# Patient Record
Sex: MALE | Race: WHITE | NOT HISPANIC OR LATINO | URBAN - METROPOLITAN AREA
[De-identification: names, ages, dates, MRNs, and addresses within clinical notes are randomized per-mention and may not be internally consistent; named-entity substitution may affect disease eponyms.]

---

## 2017-01-31 NOTE — PROGRESS NOTES
"DATE OF VISIT : 2/1/2017  LAST VISIT:02/03/2016      HISTORY OF PRESENT ILLNESS:  Mr. Jensen is a 61-year-old man originally diagnosed with CLL/SLL in 1995 ,Hernandez stage 0. He has been monitored since diagnosis without the need for therapeutic intervention. His hemoglobin and platelet count have always been stable. His lymphocyte count olga from diagnosis to 1999, when the peak was 135,500, and subsequently has slowly and steadily declined. More recently, it has been stable. He was last seen in our clinic a year ago.      INTERVAL HISTORY : Mr. Jensen has had a relatively uneventful year. He has accident while handling boat in October and had injury to left leg which is now improved.    Denies chest pain, SOB, cough, phlegmn, nausea, vomiting, abdominal pain, diarrhoea, constipation, fatigue, loss of weight, loss of appetite, urinary problems, headache, dizziness, blurry vision, fevers, chills, night sweats.     REVIEW OF SYSTEMS:  A 10-point review of systems is otherwise negative.      MEDICATIONS:   1.  Simvastatin.   2.  Citalopram.   3.  Aspirin 81 mg.      ALLERGIES:  None.      PHYSICAL EXAMINATION:   VITAL SIGNS: /79 mmHg  Pulse 84  Temp(Src) 97.9  F (36.6  C) (Oral)  Resp 12  Ht 1.712 m (5' 7.4\")  Wt 86.909 kg (191 lb 9.6 oz)  BMI 29.65 kg/m2  SpO2 95%  HEENT: Atraumatic.Anicteric. No conjunctival injection. Oropharynx - no masses.  Mucosae - moist and without lesion.   NECK:  Supple. No cervical or supraclavicular adenopathy.   CHEST:  Clear to auscultation.   AXILLAE:  No nodes.   HEART:  Regular rhythm.  No murmur.   ABDOMEN:  Soft.  No tenderness.  Bowel sounds present.  No organomegaly or mass.  No  Inguinal/femoral nodes.    EXTREMITIES:  No lower extremity edema.   SKIN:  No rashes.      LABORATORY:     Hemoglobin 15.7 grams percent with 26,900 WBC`s and 166,000 platelets.     Electrolytes - normal.  Creatinine 1.02.       Liver enzymes - normal.         ASSESSMENT AND PLAN:  Chronic " lymphocytic leukemia/small lymphocytic lymphoma , Hernandez stage 0 : Mr. Jensen is 20 years from diagnosis . He has not had significant pancytopenia, lymphadenopathy or splenomegaly, nor any apparent increase in infections, etc since diagnosis . Interestingly , he had a substantial rise in his total lymphocyte count during the late 1990s and then a subsequent fall that has been slow but relatively steady since 2002 . For the last several years his absolute lymphocyte count has been relatively stable between 20,000-30,000. He has maintained a normal hemoglobin, absolute neutrophil count and borderline platelets (~140,000-150,000). Immunoglobulin (total) levels have been stable at 0.7 (lower end of normal range) .     Mr. Jensen is doing well and will return to clinic in approximately 1 year.  In the interim , if he develops any concerning symptoms we would be happy to see him at any time .          Patient seen and discussed with Dr Ferguson.    LOLA Myles, MS.  Hematology/Oncology Fellow  [facility]  Pager [phone]    Oncology Attending    Patient seen and examined with the Oncology Fellow, Dr. Hein. Agree with her findings, assessment and plan.    Armando Ferguson MD  Professor of Medicine  Oncology  [facility]  Office: [phone]  Clinic Fax: [phone]        cc:  Elver Bourgeois MD

## 2017-02-01 ENCOUNTER — ONCOLOGY VISIT (OUTPATIENT)
Dept: ONCOLOGY | Facility: CLINIC | Age: 62
End: 2017-02-01
Attending: INTERNAL MEDICINE
Payer: COMMERCIAL

## 2017-02-01 VITALS
RESPIRATION RATE: 12 BRPM | HEIGHT: 67 IN | SYSTOLIC BLOOD PRESSURE: 116 MMHG | DIASTOLIC BLOOD PRESSURE: 79 MMHG | TEMPERATURE: 97.9 F | HEART RATE: 84 BPM | OXYGEN SATURATION: 95 % | WEIGHT: 191.6 LBS | BODY MASS INDEX: 30.07 KG/M2

## 2017-02-01 DIAGNOSIS — C91.10 CHRONIC LYMPHOCYTIC LEUKEMIA (H): Primary | ICD-10-CM

## 2017-02-01 DIAGNOSIS — C91.10 CHRONIC LYMPHOCYTIC LEUKEMIA (H): ICD-10-CM

## 2017-02-01 LAB
ALBUMIN SERPL-MCNC: 4.1 G/DL (ref 3.4–5)
ALP SERPL-CCNC: 84 U/L (ref 40–150)
ALT SERPL W P-5'-P-CCNC: 28 U/L (ref 0–70)
ANION GAP SERPL CALCULATED.3IONS-SCNC: 9 MMOL/L (ref 3–14)
AST SERPL W P-5'-P-CCNC: 16 U/L (ref 0–45)
BASOPHILS # BLD AUTO: 0.3 10E9/L (ref 0–0.2)
BASOPHILS NFR BLD AUTO: 1 %
BILIRUB SERPL-MCNC: 0.4 MG/DL (ref 0.2–1.3)
BUN SERPL-MCNC: 18 MG/DL (ref 7–30)
CALCIUM SERPL-MCNC: 9 MG/DL (ref 8.5–10.1)
CHLORIDE SERPL-SCNC: 104 MMOL/L (ref 94–109)
CO2 SERPL-SCNC: 28 MMOL/L (ref 20–32)
CREAT SERPL-MCNC: 1.02 MG/DL (ref 0.66–1.25)
DIFFERENTIAL METHOD BLD: ABNORMAL
EOSINOPHIL # BLD AUTO: 0.3 10E9/L (ref 0–0.7)
EOSINOPHIL NFR BLD AUTO: 1 %
ERYTHROCYTE [DISTWIDTH] IN BLOOD BY AUTOMATED COUNT: 12.9 % (ref 10–15)
GFR SERPL CREATININE-BSD FRML MDRD: 74 ML/MIN/1.7M2
GLUCOSE SERPL-MCNC: 86 MG/DL (ref 70–99)
HCT VFR BLD AUTO: 46.2 % (ref 40–53)
HGB BLD-MCNC: 15.7 G/DL (ref 13.3–17.7)
LDH SERPL L TO P-CCNC: 170 U/L (ref 85–227)
LYMPHOCYTES # BLD AUTO: 20.4 10E9/L (ref 0.8–5.3)
LYMPHOCYTES NFR BLD AUTO: 76 %
MCH RBC QN AUTO: 29.4 PG (ref 26.5–33)
MCHC RBC AUTO-ENTMCNC: 34 G/DL (ref 31.5–36.5)
MCV RBC AUTO: 87 FL (ref 78–100)
MONOCYTES # BLD AUTO: 0.8 10E9/L (ref 0–1.3)
MONOCYTES NFR BLD AUTO: 3 %
NEUTROPHILS # BLD AUTO: 5.1 10E9/L (ref 1.6–8.3)
NEUTROPHILS NFR BLD AUTO: 19 %
PLATELET # BLD AUTO: 166 10E9/L (ref 150–450)
POTASSIUM SERPL-SCNC: 4 MMOL/L (ref 3.4–5.3)
PROT SERPL-MCNC: 7 G/DL (ref 6.8–8.8)
RBC # BLD AUTO: 5.34 10E12/L (ref 4.4–5.9)
RBC MORPH BLD: NORMAL
SODIUM SERPL-SCNC: 142 MMOL/L (ref 133–144)
WBC # BLD AUTO: 26.9 10E9/L (ref 4–11)

## 2017-02-01 PROCEDURE — 36415 COLL VENOUS BLD VENIPUNCTURE: CPT | Performed by: HOSPITALIST

## 2017-02-01 PROCEDURE — 99213 OFFICE O/P EST LOW 20 MIN: CPT | Mod: ZP | Performed by: INTERNAL MEDICINE

## 2017-02-01 PROCEDURE — 82232 ASSAY OF BETA-2 PROTEIN: CPT | Performed by: HOSPITALIST

## 2017-02-01 PROCEDURE — 99212 OFFICE O/P EST SF 10 MIN: CPT

## 2017-02-01 PROCEDURE — 80053 COMPREHEN METABOLIC PANEL: CPT | Performed by: HOSPITALIST

## 2017-02-01 PROCEDURE — 85025 COMPLETE CBC W/AUTO DIFF WBC: CPT | Performed by: HOSPITALIST

## 2017-02-01 PROCEDURE — 83615 LACTATE (LD) (LDH) ENZYME: CPT | Performed by: HOSPITALIST

## 2017-02-01 ASSESSMENT — PAIN SCALES - GENERAL: PAINLEVEL: NO PAIN (0)

## 2017-02-01 NOTE — Clinical Note
"2/1/2017       RE: Lito Jensen  [address]  [place] [address]     Dear Colleague,    Thank you for referring your patient, Lito Jensen, to the [facility]. Please see a copy of my visit note below.    DATE OF VISIT : 2/1/2017  LAST VISIT:02/03/2016      HISTORY OF PRESENT ILLNESS:  Mr. Jensen is a 61-year-old man originally diagnosed with CLL/SLL in 1995 ,Hernandez stage 0.He has been monitored since diagnosis without the need for therapeutic intervention. His hemoglobin and platelet count have always been stable. His lymphocyte count olga from diagnosis to 1999, when the peak was 135,500, and subsequently has slowly and steadily declined.  He was last seen in our clinic a year ago.      INTERVAL HISTORY : Mr. Jensen has had a relatively uneventful year. He has accident while handling boat in October and had injury to left leg which is now improved.  Denies chest pain, SOB, cough, phlegmn, nausea, vomiting, abdominal pain, diarrhoea, constipation, fatigue, loss of weight, loss of appetite, urinary problems, headache, dizziness, blurry vision, fevers, chills, night sweats.     REVIEW OF SYSTEMS:  A 10-point review of systems is otherwise negative.      MEDICATIONS:   1.  Simvastatin.   2.  Citalopram.   3.  Aspirin 81 mg.      ALLERGIES:  None.      PHYSICAL EXAMINATION:   VITAL SIGNS: /79 mmHg  Pulse 84  Temp(Src) 97.9  F (36.6  C) (Oral)  Resp 12  Ht 1.712 m (5' 7.4\")  Wt 86.909 kg (191 lb 9.6 oz)  BMI 29.65 kg/m2  SpO2 95%  HEENT: Atraumatic, normocephalic head . Anicteric. No conjunctival injection. Oropharynx - no masses.  Mucosae - moist and without lesion.   NECK:  Supple. Thyroid normal.  No cervical or supraclavicular adenopathy.   CHEST:  Clear to auscultation.   AXILLAE:  No nodes.   HEART:  Regular rhythm.  No murmur.   ABDOMEN:  Soft.  No tenderness.  Bowel sounds present.  No organomegaly or mass.  No  Inguinal/femoral nodes.    EXTREMITIES: "  No lower extremity edema.   SKIN:  No rashes.      LABORATORY:     Hemoglobin 15.7 grams percent with 26,900 WBC`s and 166,000 platelets.     Electrolytes - normal.  Creatinine 1.02.       Liver enzymes - normal.            ASSESSMENT AND PLAN:  Chronic lymphocytic leukemia/small lymphocytic lymphoma , Hernandez stage 0 : Mr. Jensen is 20 years from diagnosis . He has not had significant pancytopenia, lymphadenopathy or splenomegaly, nor any apparent increase in infections, etc since diagnosis . Interestingly , he had a substantial rise in his total lymphocyte count during the late 1990s and then a subsequent fall that has been slow but relatively steady since 2002 . For the last several years his absolute lymphocyte count has been relatively stable between 20,000-30,000. He has maintained a normal hemoglobin, absolute neutrophil count and borderline platelets (~140,000-150,000). Immunoglobulin (total) levels have been stable at 0.7 (lower end of normal range) .     Mr. Jensen is doing well and will return to clinic in approximately 1 year.  In the interim , if he develops any concerning symptoms we would be happy to see him at any time .          Patient seen and discussed with Dr Ferguson.    LOLA Myles, MS.  Hematology/Oncology Fellow  [facility]  Pager [phone]      cc:  Elver Bourgeois MD         Again, thank you for allowing me to participate in the care of your patient.      Sincerely,    Armando Ferguson MD

## 2017-02-01 NOTE — NURSING NOTE
"Lito Jensen is a 61 year old male who presents for:  Chief Complaint   Patient presents with     Oncology Clinic Visit     ump return        Initial Vitals:  /79 mmHg  Pulse 84  Temp(Src) 97.9  F (36.6  C) (Oral)  Resp 12  Ht 1.712 m (5' 7.4\")  Wt 86.909 kg (191 lb 9.6 oz)  BMI 29.65 kg/m2  SpO2 95% Estimated body mass index is 29.65 kg/(m^2) as calculated from the following:    Height as of this encounter: 1.712 m (5' 7.4\").    Weight as of this encounter: 86.909 kg (191 lb 9.6 oz).. Body surface area is 2.03 meters squared. BP completed using cuff size: regular  No Pain (0) No LMP for male patient. Allergies and medications reviewed.     Medications: Medication refills not needed today.  Pharmacy name entered into [facility]: [facility] - [place], [place] - [address] [place] [address]    Comments: pt denies pain    5 minutes for nursing intake (face to face time)   Virgilio Dawson CMA          "

## 2017-02-01 NOTE — Clinical Note
"2/1/2017      RE: Lito Jensen  [address] [place] [address]       DATE OF VISIT : 2/1/2017  LAST VISIT:02/03/2016      HISTORY OF PRESENT ILLNESS:  Mr. Jensen is a 61-year-old man originally diagnosed with CLL/SLL in 1995 ,Hernandez stage 0. He has been monitored since diagnosis without the need for therapeutic intervention. His hemoglobin and platelet count have always been stable. His lymphocyte count olga from diagnosis to 1999, when the peak was 135,500, and subsequently has slowly and steadily declined. More recently, it has been stable. He was last seen in our clinic a year ago.      INTERVAL HISTORY : Mr. Jensen has had a relatively uneventful year. He has accident while handling boat in October and had injury to left leg which is now improved.    Denies chest pain, SOB, cough, phlegmn, nausea, vomiting, abdominal pain, diarrhoea, constipation, fatigue, loss of weight, loss of appetite, urinary problems, headache, dizziness, blurry vision, fevers, chills, night sweats.     REVIEW OF SYSTEMS:  A 10-point review of systems is otherwise negative.      MEDICATIONS:   1.  Simvastatin.   2.  Citalopram.   3.  Aspirin 81 mg.      ALLERGIES:  None.      PHYSICAL EXAMINATION:   VITAL SIGNS: /79 mmHg  Pulse 84  Temp(Src) 97.9  F (36.6  C) (Oral)  Resp 12  Ht 1.712 m (5' 7.4\")  Wt 86.909 kg (191 lb 9.6 oz)  BMI 29.65 kg/m2  SpO2 95%  HEENT: Atraumatic.Anicteric. No conjunctival injection. Oropharynx - no masses.  Mucosae - moist and without lesion.   NECK:  Supple. No cervical or supraclavicular adenopathy.   CHEST:  Clear to auscultation.   AXILLAE:  No nodes.   HEART:  Regular rhythm.  No murmur.   ABDOMEN:  Soft.  No tenderness.  Bowel sounds present.  No organomegaly or mass.  No  Inguinal/femoral nodes.    EXTREMITIES:  No lower extremity edema.   SKIN:  No rashes.      LABORATORY:     Hemoglobin 15.7 grams percent with 26,900 WBC`s and 166,000 platelets.     Electrolytes - " normal.  Creatinine 1.02.       Liver enzymes - normal.         ASSESSMENT AND PLAN:  Chronic lymphocytic leukemia/small lymphocytic lymphoma , Hernandez stage 0 : Mr. Jensen is 20 years from diagnosis . He has not had significant pancytopenia, lymphadenopathy or splenomegaly, nor any apparent increase in infections, etc since diagnosis . Interestingly , he had a substantial rise in his total lymphocyte count during the late 1990s and then a subsequent fall that has been slow but relatively steady since 2002 . For the last several years his absolute lymphocyte count has been relatively stable between 20,000-30,000. He has maintained a normal hemoglobin, absolute neutrophil count and borderline platelets (~140,000-150,000). Immunoglobulin (total) levels have been stable at 0.7 (lower end of normal range) .     Mr. Jensen is doing well and will return to clinic in approximately 1 year.  In the interim , if he develops any concerning symptoms we would be happy to see him at any time .          Patient seen and discussed with Dr Ferguson.    LOLA Myles, MS.  Hematology/Oncology Fellow  [facility]  Pager [phone]    Oncology Attending    Patient seen and examined with the Oncology Fellow, Dr. Hein. Agree with her findings, assessment and plan.    Armando Ferguson MD  Professor of Medicine  Oncology  [facility]  Office: [phone]  Clinic Fax: [phone]        cc:  MD Armando Fields MD

## 2017-02-02 LAB — B2 MICROGLOB SERPL-MCNC: 2.1 MG/L

## 2018-01-31 ENCOUNTER — TRANSFERRED RECORDS (OUTPATIENT)
Dept: HEALTH INFORMATION MANAGEMENT | Facility: CLINIC | Age: 63
End: 2018-01-31

## 2018-01-31 ENCOUNTER — APPOINTMENT (OUTPATIENT)
Dept: LAB | Facility: CLINIC | Age: 63
End: 2018-01-31
Attending: INTERNAL MEDICINE
Payer: COMMERCIAL

## 2018-01-31 ENCOUNTER — ONCOLOGY VISIT (OUTPATIENT)
Dept: ONCOLOGY | Facility: CLINIC | Age: 63
End: 2018-01-31
Attending: INTERNAL MEDICINE
Payer: COMMERCIAL

## 2018-01-31 VITALS
RESPIRATION RATE: 16 BRPM | OXYGEN SATURATION: 94 % | HEIGHT: 67 IN | WEIGHT: 203.71 LBS | SYSTOLIC BLOOD PRESSURE: 109 MMHG | HEART RATE: 90 BPM | BODY MASS INDEX: 31.97 KG/M2 | TEMPERATURE: 97.4 F | DIASTOLIC BLOOD PRESSURE: 74 MMHG

## 2018-01-31 DIAGNOSIS — C91.10 CHRONIC LYMPHOCYTIC LEUKEMIA (H): ICD-10-CM

## 2018-01-31 LAB
ALBUMIN SERPL-MCNC: 4.1 G/DL (ref 3.4–5)
ALP SERPL-CCNC: 91 U/L (ref 40–150)
ALT SERPL W P-5'-P-CCNC: 33 U/L (ref 0–70)
ANION GAP SERPL CALCULATED.3IONS-SCNC: 7 MMOL/L (ref 3–14)
AST SERPL W P-5'-P-CCNC: 22 U/L (ref 0–45)
BASOPHILS # BLD AUTO: 0 10E9/L (ref 0–0.2)
BASOPHILS NFR BLD AUTO: 0 %
BILIRUB SERPL-MCNC: 0.4 MG/DL (ref 0.2–1.3)
BUN SERPL-MCNC: 22 MG/DL (ref 7–30)
CALCIUM SERPL-MCNC: 8.7 MG/DL (ref 8.5–10.1)
CHLORIDE SERPL-SCNC: 105 MMOL/L (ref 94–109)
CO2 SERPL-SCNC: 26 MMOL/L (ref 20–32)
CREAT SERPL-MCNC: 0.92 MG/DL (ref 0.66–1.25)
DIFFERENTIAL METHOD BLD: ABNORMAL
EOSINOPHIL # BLD AUTO: 0.7 10E9/L (ref 0–0.7)
EOSINOPHIL NFR BLD AUTO: 3 %
ERYTHROCYTE [DISTWIDTH] IN BLOOD BY AUTOMATED COUNT: 12.8 % (ref 10–15)
GFR SERPL CREATININE-BSD FRML MDRD: 83 ML/MIN/1.7M2
GLUCOSE SERPL-MCNC: 89 MG/DL (ref 70–99)
HCT VFR BLD AUTO: 46.4 % (ref 40–53)
HGB BLD-MCNC: 15.7 G/DL (ref 13.3–17.7)
LYMPHOCYTES # BLD AUTO: 12.8 10E9/L (ref 0.8–5.3)
LYMPHOCYTES NFR BLD AUTO: 57 %
MCH RBC QN AUTO: 29.5 PG (ref 26.5–33)
MCHC RBC AUTO-ENTMCNC: 33.8 G/DL (ref 31.5–36.5)
MCV RBC AUTO: 87 FL (ref 78–100)
MONOCYTES # BLD AUTO: 0.7 10E9/L (ref 0–1.3)
MONOCYTES NFR BLD AUTO: 3 %
NEUTROPHILS # BLD AUTO: 8.3 10E9/L (ref 1.6–8.3)
NEUTROPHILS NFR BLD AUTO: 37 %
PLATELET # BLD AUTO: 163 10E9/L (ref 150–450)
POTASSIUM SERPL-SCNC: 4.2 MMOL/L (ref 3.4–5.3)
PROT SERPL-MCNC: 7.2 G/DL (ref 6.8–8.8)
RBC # BLD AUTO: 5.33 10E12/L (ref 4.4–5.9)
RBC MORPH BLD: NORMAL
SODIUM SERPL-SCNC: 139 MMOL/L (ref 133–144)
WBC # BLD AUTO: 22.4 10E9/L (ref 4–11)

## 2018-01-31 PROCEDURE — 85025 COMPLETE CBC W/AUTO DIFF WBC: CPT | Performed by: INTERNAL MEDICINE

## 2018-01-31 PROCEDURE — 36415 COLL VENOUS BLD VENIPUNCTURE: CPT

## 2018-01-31 PROCEDURE — G0463 HOSPITAL OUTPT CLINIC VISIT: HCPCS | Mod: ZF

## 2018-01-31 PROCEDURE — 00000402 ZZHCL STATISTIC TOTAL PROTEIN: Performed by: INTERNAL MEDICINE

## 2018-01-31 PROCEDURE — 99213 OFFICE O/P EST LOW 20 MIN: CPT | Mod: ZP | Performed by: INTERNAL MEDICINE

## 2018-01-31 PROCEDURE — 84165 PROTEIN E-PHORESIS SERUM: CPT | Performed by: INTERNAL MEDICINE

## 2018-01-31 PROCEDURE — 80053 COMPREHEN METABOLIC PANEL: CPT | Performed by: INTERNAL MEDICINE

## 2018-01-31 ASSESSMENT — PAIN SCALES - GENERAL: PAINLEVEL: NO PAIN (0)

## 2018-01-31 NOTE — MR AVS SNAPSHOT
"              After Visit Summary   2018    Lito Jensen    MRN: [id number]           Patient Information     Date Of Birth          1955        Visit Information        Provider Department      2018 4:00 PM Armando Ferguson MD [facility]        Today's Diagnoses     Chronic lymphocytic leukemia (H)           Follow-ups after your visit        Follow-up notes from your care team     Return in about 1 year (around 2019) for Physical Exam, Lab Work.      Who to contact     If you have questions or need follow up information about today's clinic visit or your schedule please contact [facility] directly at [phone].  Normal or non-critical lab and imaging results will be communicated to you by MyChart, letter or phone within 4 business days after the clinic has received the results. If you do not hear from us within 7 days, please contact the clinic through MyChart or phone. If you have a critical or abnormal lab result, we will notify you by phone as soon as possible.  Submit refill requests through [employer] or call your pharmacy and they will forward the refill request to us. Please allow 3 business days for your refill to be completed.          Additional Information About Your Visit        MyChart Information     [employer] lets you send messages to your doctor, view your test results, renew your prescriptions, schedule appointments and more. To sign up, go to [email or web address] . Click on \"Log in\" on the left side of the screen, which will take you to the Welcome page. Then click on \"Sign up Now\" on the right side of the page.     You will be asked to enter the access code listed below, as well as some personal information. Please follow the directions to create your username and password.     Your access code is: [email or web address]  Expires: 2018  6:30 AM     Your access code will  in 90 days. If you need help or a new code, please call " "your [facility] or [phone].        Care EveryWhere ID     This is your Care EveryWhere ID. This could be used by other organizations to access your [facility] medical records  [id number]        Your Vitals Were     Pulse Temperature Respirations Height Pulse Oximetry BMI (Body Mass Index)    90 97.4  F (36.3  C) (Oral) 16 1.712 m (5' 7.4\") 94% 31.53 kg/m2       Blood Pressure from Last 3 Encounters:   01/31/18 109/74   02/01/17 116/79   02/03/16 123/70    Weight from Last 3 Encounters:   01/31/18 92.4 kg (203 lb 11.3 oz)   02/01/17 86.9 kg (191 lb 9.6 oz)   02/03/16 87.5 kg (192 lb 14.4 oz)              We Performed the Following     *CBC with platelets differential     Comprehensive metabolic panel     Protein electrophoresis        Primary Care Provider Office Phone # Fax #    Armando Ferguson [phone]       [address] [place] [address]        Equal Access to Services     [facility]: Hadii aad lillie hadasho Sojohn, waaxda luqadaha, qaybta kaalmada lyric, baylee moore . So [facility] [phone].    ATENCIÓN: Si habla español, tiene a whipple disposición servicios gratuitos de asistencia lingüística. [facility] [phone].    We comply with applicable federal civil rights laws and [place] laws. We do not discriminate on the basis of race, color, national origin, age, disability, sex, sexual orientation, or gender identity.            Thank you!     Thank you for choosing [facility]  for your care. Our goal is always to provide you with excellent care. Hearing back from our patients is one way we can continue to improve our services. Please take a few minutes to complete the written survey that you may receive in the mail after your visit with us. Thank you!             Your Updated Medication List - Protect others around you: Learn how to safely use, store and throw away your medicines at www.disposemymeds.org.        "   This list is accurate as of 1/31/18 11:59 PM.  Always use your most recent med list.                   Brand Name Dispense Instructions for use Diagnosis    aspirin 81 MG tablet      Take 1 tablet by mouth daily.    Chronic lymphoid leukemia, without mention of having achieved remission(204.10) (H)       citalopram 10 MG tablet    celeXA     Take 10 mg by mouth daily.    Chronic lymphoid leukemia, without mention of having achieved remission(204.10) (H)       simvastatin 10 MG tablet    ZOCOR     Take 10 mg by mouth At Bedtime.    Chronic lymphoid leukemia, without mention of having achieved remission(204.10) (H)       UNABLE TO FIND      2 times daily. Flekainid 200 mg twice daily

## 2018-01-31 NOTE — LETTER
1/31/2018      RE: Lito Jensen  [address] [place] [address]       ONCOLOGY SUMMARY:  Mr. Jensen is a 62-year-old originally diagnosed with CLL/SLL in 1995 ,Hernandez stage 0. He has been monitored since diagnosis without the need for therapeutic intervention. His hemoglobin and platelet count have always been stable. His lymphocyte count olga from diagnosis to 1999, when the peak reached 135,500, and subsequently has slowly and steadily declined. More recently, it has been stable. He was last seen in our clinic a year ago.        INTERVAL HISTORY:  Mr. Jensen was last in clinic on 02/01/2017.  Health-wise, this has been an uneventful year.  He has had an upper respiratory tract infection beginning in December.  This went on and there was an exacerbation or a second infection subsequently.  He was given a course of antibiotics.  Slowly, all the symptoms have resolved.  He had no fever with the infection.       Otherwise, his health has been fine.  He is stressed by the illness of his mother-in-law.  Mr. Jensen and his wife have assumed considerable responsibility for her care.       REVIEW OF SYSTEMS:  A 10-point review of systems is essentially negative.      MEDICATIONS:   1.  Simvastatin.   2.  Citalopram.   3.  Aspirin 81 mg.      ALLERGIES:  None reported.      PHYSICAL EXAMINATION:   VITAL SIGNS:  Weight 92.4 kg (compared with 86.9 kg in 02/2017), blood pressure 109/74, pulse 90 and regular, respirations 16, temperature 97.4, O2 saturation 94% on room air.   HEENT:  Anicteric.  Oropharynx - no masses.  Mucosa - moist.  No plaque or ulceration.   NECK:  No cervical/supraclavicular adenopathy.  Thyroid appears normal.   CHEST:  Clear to auscultation.   AXILLAE:  No nodes.   HEART:  Regular rhythm without murmur or gallop.   ABDOMEN:  Soft and nontender.  Bowel sounds active.  The liver is at the right costal margin and spans approximately 10 cm.  The spleen is not palpable or  percussible.  No masses.  No significant inguinal/femoral nodes.   EXTREMITIES:  No lower extremity edema.  No epitrochlear nodes.   SKIN:  No evident rashes.      LABORATORY:     Hemoglobin 15.7 grams percent with 22,400 WBCs (37% neutrophils, 57% lymphocytes) and 163,000 platelets.   Electrolytes, calcium, creatinine (0.92) - normal.    Liver enzymes - normal.   Glucose 89 (non-fasting).   SPEP: albumin 4.5, Ig 0.8, no monoclonal peak     ASSESSMENT AND PLAN:  Chronic lymphocytic leukemia/small lymphocytic lymphoma, Hernandez stage 0. Mr. Jensen continues to do well over 2 decades after the time of diagnosis.  His absolute lymphocyte count continues to fall slowly with his WBC as summarized in my note from a year ago.  Hemoglobin, neutrophils and platelets remain normal.      Return to clinic in 1 year with laboratory studies.     Armando Ferguson MD  Professor of Medicine  Oncology  [facility]  Office: [phone]  Clinic Fax: [phone]       cc:  MD Armando Fields MD

## 2018-01-31 NOTE — NURSING NOTE
Chief Complaint   Patient presents with     Blood Draw     labs drawn via venipuncture     /74 (BP Location: Right arm, Patient Position: Chair, Cuff Size: Adult Large)  Pulse 90  Temp 97.4  F (36.3  C) (Oral)  Wt 92.4 kg (203 lb 11.3 oz)  SpO2 94%  BMI 31.53 kg/m2    Vitals taken.  Labs collected and sent from right  antecubital venipuncture. Pt tolerated well. Pt checked in for next appointment.    Ileana Gregorio RN

## 2018-01-31 NOTE — PROGRESS NOTES
ONCOLOGY SUMMARY:  Mr. Jensen is a 62-year-old originally diagnosed with CLL/SLL in 1995 ,Hernandez stage 0. He has been monitored since diagnosis without the need for therapeutic intervention. His hemoglobin and platelet count have always been stable. His lymphocyte count olga from diagnosis to 1999, when the peak reached 135,500, and subsequently has slowly and steadily declined. More recently, it has been stable. He was last seen in our clinic a year ago.        INTERVAL HISTORY:  Mr. Jensen was last in clinic on 02/01/2017.  Health-wise, this has been an uneventful year.  He has had an upper respiratory tract infection beginning in December.  This went on and there was an exacerbation or a second infection subsequently.  He was given a course of antibiotics.  Slowly, all the symptoms have resolved.  He had no fever with the infection.       Otherwise, his health has been fine.  He is stressed by the illness of his mother-in-law.  Mr. Jensen and his wife have assumed considerable responsibility for her care.       REVIEW OF SYSTEMS:  A 10-point review of systems is essentially negative.      MEDICATIONS:   1.  Simvastatin.   2.  Citalopram.   3.  Aspirin 81 mg.      ALLERGIES:  None reported.      PHYSICAL EXAMINATION:   VITAL SIGNS:  Weight 92.4 kg (compared with 86.9 kg in 02/2017), blood pressure 109/74, pulse 90 and regular, respirations 16, temperature 97.4, O2 saturation 94% on room air.   HEENT:  Anicteric.  Oropharynx - no masses.  Mucosa - moist.  No plaque or ulceration.   NECK:  No cervical/supraclavicular adenopathy.  Thyroid appears normal.   CHEST:  Clear to auscultation.   AXILLAE:  No nodes.   HEART:  Regular rhythm without murmur or gallop.   ABDOMEN:  Soft and nontender.  Bowel sounds active.  The liver is at the right costal margin and spans approximately 10 cm.  The spleen is not palpable or percussible.  No masses.  No significant inguinal/femoral nodes.   EXTREMITIES:  No lower extremity  edema.  No epitrochlear nodes.   SKIN:  No evident rashes.      LABORATORY:     Hemoglobin 15.7 grams percent with 22,400 WBCs (37% neutrophils, 57% lymphocytes) and 163,000 platelets.   Electrolytes, calcium, creatinine (0.92) - normal.    Liver enzymes - normal.   Glucose 89 (non-fasting).   SPEP: albumin 4.5, Ig 0.8, no monoclonal peak     ASSESSMENT AND PLAN:  Chronic lymphocytic leukemia/small lymphocytic lymphoma, Hernandez stage 0. Mr. Jensen continues to do well over 2 decades after the time of diagnosis.  His absolute lymphocyte count continues to fall slowly with his WBC as summarized in my note from a year ago.  Hemoglobin, neutrophils and platelets remain normal.      Return to clinic in 1 year with laboratory studies.     Armando Ferguson MD  Professor of Medicine  Oncology  [facility]  Office: [phone]  Clinic Fax: [phone]       cc:  Elver Bourgeois MD

## 2018-01-31 NOTE — NURSING NOTE
"Oncology Rooming Note    January 31, 2018 3:43 PM   Lito Jensen is a 62 year old male who presents for:    Chief Complaint   Patient presents with     Blood Draw     labs drawn via venipuncture     Oncology Clinic Visit     Return visit related to CLL     Initial Vitals: /74 (BP Location: Right arm, Patient Position: Chair, Cuff Size: Adult Large)  Pulse 90  Temp 97.4  F (36.3  C) (Oral)  Resp 16  Ht 1.712 m (5' 7.4\")  Wt 92.4 kg (203 lb 11.3 oz)  SpO2 94%  BMI 31.53 kg/m2 Estimated body mass index is 31.53 kg/(m^2) as calculated from the following:    Height as of this encounter: 1.712 m (5' 7.4\").    Weight as of this encounter: 92.4 kg (203 lb 11.3 oz). Body surface area is 2.1 meters squared.  No Pain (0) Comment: Data Unavailable   No LMP for male patient.  Allergies reviewed: Yes  Medications reviewed: Yes    Medications: Medication refills not needed today.  Pharmacy name entered into [facility]: [facility] - [place], [place] - [address] [place] [address]    Clinical concerns: No new concerns. Provider was notified.    10 minutes for nursing intake (face to face time)     Maria Del Carmen Mario LPN            "

## 2018-02-01 LAB
ALBUMIN SERPL ELPH-MCNC: 4.5 G/DL (ref 3.7–5.1)
ALPHA1 GLOB SERPL ELPH-MCNC: 0.3 G/DL (ref 0.2–0.4)
ALPHA2 GLOB SERPL ELPH-MCNC: 0.6 G/DL (ref 0.5–0.9)
B-GLOBULIN SERPL ELPH-MCNC: 0.7 G/DL (ref 0.6–1)
GAMMA GLOB SERPL ELPH-MCNC: 0.8 G/DL (ref 0.7–1.6)
M PROTEIN SERPL ELPH-MCNC: 0 G/DL
PROT PATTERN SERPL ELPH-IMP: NORMAL

## 2018-09-27 ENCOUNTER — ONCOLOGY VISIT (OUTPATIENT)
Dept: ONCOLOGY | Facility: CLINIC | Age: 63
End: 2018-09-27
Attending: INTERNAL MEDICINE
Payer: COMMERCIAL

## 2018-09-27 VITALS
SYSTOLIC BLOOD PRESSURE: 113 MMHG | BODY MASS INDEX: 30.97 KG/M2 | OXYGEN SATURATION: 96 % | DIASTOLIC BLOOD PRESSURE: 75 MMHG | TEMPERATURE: 97.6 F | HEIGHT: 67 IN | RESPIRATION RATE: 16 BRPM | HEART RATE: 91 BPM | WEIGHT: 197.3 LBS

## 2018-09-27 DIAGNOSIS — C91.10 CHRONIC LYMPHOCYTIC LEUKEMIA (H): ICD-10-CM

## 2018-09-27 DIAGNOSIS — C91.10 CHRONIC LYMPHOCYTIC LEUKEMIA (H): Primary | ICD-10-CM

## 2018-09-27 LAB
ALBUMIN SERPL-MCNC: 3.8 G/DL (ref 3.4–5)
ALP SERPL-CCNC: 90 U/L (ref 40–150)
ALT SERPL W P-5'-P-CCNC: 36 U/L (ref 0–70)
ANION GAP SERPL CALCULATED.3IONS-SCNC: 7 MMOL/L (ref 3–14)
AST SERPL W P-5'-P-CCNC: 24 U/L (ref 0–45)
BASOPHILS # BLD AUTO: 0.1 10E9/L (ref 0–0.2)
BASOPHILS NFR BLD AUTO: 0.4 %
BILIRUB SERPL-MCNC: 0.4 MG/DL (ref 0.2–1.3)
BUN SERPL-MCNC: 17 MG/DL (ref 7–30)
CALCIUM SERPL-MCNC: 8.7 MG/DL (ref 8.5–10.1)
CHLORIDE SERPL-SCNC: 106 MMOL/L (ref 94–109)
CO2 SERPL-SCNC: 26 MMOL/L (ref 20–32)
CREAT SERPL-MCNC: 1.14 MG/DL (ref 0.66–1.25)
DIFFERENTIAL METHOD BLD: ABNORMAL
EOSINOPHIL # BLD AUTO: 0.2 10E9/L (ref 0–0.7)
EOSINOPHIL NFR BLD AUTO: 0.8 %
ERYTHROCYTE [DISTWIDTH] IN BLOOD BY AUTOMATED COUNT: 13.3 % (ref 10–15)
GFR SERPL CREATININE-BSD FRML MDRD: 65 ML/MIN/1.7M2
GLUCOSE SERPL-MCNC: 128 MG/DL (ref 70–99)
HCT VFR BLD AUTO: 44.4 % (ref 40–53)
HGB BLD-MCNC: 15.1 G/DL (ref 13.3–17.7)
IMM GRANULOCYTES # BLD: 0 10E9/L (ref 0–0.4)
IMM GRANULOCYTES NFR BLD: 0.2 %
LYMPHOCYTES # BLD AUTO: 11.6 10E9/L (ref 0.8–5.3)
LYMPHOCYTES NFR BLD AUTO: 64.2 %
MCH RBC QN AUTO: 30 PG (ref 26.5–33)
MCHC RBC AUTO-ENTMCNC: 34 G/DL (ref 31.5–36.5)
MCV RBC AUTO: 88 FL (ref 78–100)
MONOCYTES # BLD AUTO: 0.7 10E9/L (ref 0–1.3)
MONOCYTES NFR BLD AUTO: 3.8 %
NEUTROPHILS # BLD AUTO: 5.5 10E9/L (ref 1.6–8.3)
NEUTROPHILS NFR BLD AUTO: 30.6 %
NRBC # BLD AUTO: 0.1 10*3/UL
NRBC BLD AUTO-RTO: 1 /100
PLATELET # BLD AUTO: 153 10E9/L (ref 150–450)
POTASSIUM SERPL-SCNC: 4 MMOL/L (ref 3.4–5.3)
PROT SERPL-MCNC: 6.9 G/DL (ref 6.8–8.8)
RBC # BLD AUTO: 5.03 10E12/L (ref 4.4–5.9)
SODIUM SERPL-SCNC: 140 MMOL/L (ref 133–144)
URATE SERPL-MCNC: 6.5 MG/DL (ref 3.5–7.2)
WBC # BLD AUTO: 18 10E9/L (ref 4–11)

## 2018-09-27 PROCEDURE — 80053 COMPREHEN METABOLIC PANEL: CPT | Performed by: INTERNAL MEDICINE

## 2018-09-27 PROCEDURE — G0463 HOSPITAL OUTPT CLINIC VISIT: HCPCS | Mod: ZF

## 2018-09-27 PROCEDURE — 85025 COMPLETE CBC W/AUTO DIFF WBC: CPT | Performed by: INTERNAL MEDICINE

## 2018-09-27 PROCEDURE — 84550 ASSAY OF BLOOD/URIC ACID: CPT | Performed by: INTERNAL MEDICINE

## 2018-09-27 PROCEDURE — 36415 COLL VENOUS BLD VENIPUNCTURE: CPT

## 2018-09-27 PROCEDURE — 99213 OFFICE O/P EST LOW 20 MIN: CPT | Mod: GC | Performed by: INTERNAL MEDICINE

## 2018-09-27 PROCEDURE — 00000402 ZZHCL STATISTIC TOTAL PROTEIN: Performed by: INTERNAL MEDICINE

## 2018-09-27 PROCEDURE — 84165 PROTEIN E-PHORESIS SERUM: CPT | Performed by: INTERNAL MEDICINE

## 2018-09-27 RX ORDER — FLECAINIDE ACETATE 100 MG/1
100 TABLET ORAL PRN
COMMUNITY
Start: 2018-04-04

## 2018-09-27 ASSESSMENT — PAIN SCALES - GENERAL: PAINLEVEL: NO PAIN (0)

## 2018-09-27 NOTE — NURSING NOTE
"Oncology Rooming Note    September 27, 2018 3:37 PM   Lito Jensen is a 63 year old male who presents for:    Chief Complaint   Patient presents with     Blood Draw     Labs drawn, vitals taken and patient checked into next appt.     RECHECK     ONC CLL      Initial Vitals: /75 (BP Location: Left arm, Patient Position: Sitting, Cuff Size: Adult Regular)  Pulse 91  Temp 97.6  F (36.4  C)  Resp 16  Ht 1.702 m (5' 7.01\")  Wt 89.5 kg (197 lb 4.8 oz)  SpO2 96%  BMI 30.89 kg/m2 Estimated body mass index is 30.89 kg/(m^2) as calculated from the following:    Height as of this encounter: 1.702 m (5' 7.01\").    Weight as of this encounter: 89.5 kg (197 lb 4.8 oz). Body surface area is 2.06 meters squared.  No Pain (0) Comment: Data Unavailable   No LMP for male patient.  Allergies reviewed: Yes  Medications reviewed: Yes    Medications: Medication refills not needed today.  Pharmacy name entered into [employer]: [facility] - [place], [place] - [address] [place] [address]    Clinical concerns: none      6 minutes for nursing intake (face to face time)     Briana YASMIN Ortega              "

## 2018-09-27 NOTE — LETTER
"9/27/2018      RE: Lito Jensen  [address]  [place] [address]       ONCOLOGY CLINIC NOTE  September 27, 2018    ONCOLOGY SUMMARY:  Mr. Jensen is a 63-year-old diagnosed with CLL/SLL in 1995 ,Hernandez stage 0. He has been monitored since diagnosis without the need for any therapeutic intervention. His hemoglobin, neutrophils and platelet count have remained stable. His lymphocyte count olga from diagnosis to 1999, when it peaked at135,500, and subsequently has slowly and steadily declined. More recently, it has been stable. He has had no known complications or other illnesses related to the CLL       INTERVAL HISTORY:  Mr. Jensen was last in clinic on 1/31/18 and has had no significant infections or other health-related issues, another uneventful year.  He reports no significant events with his health this year. He denies any changes in his weight, appetite. Denies fever, sweats. Denies any dizziness, sob, abdominal pain, nausea, vomiting, diarrhea, headache.      REVIEW OF SYSTEMS:  A 10-point review of systems is essentially negative.      MEDICATIONS:     Current Outpatient Prescriptions   Medication     aspirin 81 MG tablet     citalopram (CELEXA) 10 MG tablet     flecainide (TAMBOCOR) 100 MG tablet     simvastatin (ZOCOR) 10 MG tablet     UNABLE TO FIND     No current facility-administered medications for this visit.      ALLERGIES:  None reported.      PHYSICAL EXAMINATION:   /75 (BP Location: Left arm, Patient Position: Sitting, Cuff Size: Adult Regular)  Pulse 91  Temp 97.6  F (36.4  C)  Resp 16  Ht 1.702 m (5' 7.01\")  Wt 89.5 kg (197 lb 4.8 oz)  SpO2 96%  BMI 30.89 kg/m2  HEENT:  Anicteric.  No oropharyngeal masses.  Mucosa - moist and without lesion.   NECK:  No cervical/supraclavicular adenopathy.  CHEST:  Clear.   AXILLAE:  Negative.   HEART:  Regular rhythm, no murmur.   ABDOMEN:  Soft, nontender with active owel sounds.  Liiver is not enlarged and the spleen is not " palpable.  No masses.  No inguinal nodes.   EXTREMITIES:  No lower extremity edema.  No epitrochlear nodes.   SKIN:  No rashes.      LABORATORY:     Hemoglobin 15.1 grams percent with 18,000 WBCs (31% neutrophils, 64% lymphocytes) and 153,000 platelets.   Electrolytes, calcium, creatinine (1.14)  - normal.    Liver enzymes - normal.   Glucose 128 (non-fasting).      ASSESSMENT AND PLAN:  Chronic lymphocytic leukemia/small lymphocytic lymphoma, Hernandez stage 0. Mr. Jensen is doing well now about 23 years since diagnosis.  His absolute lymphocyte count remains stable after having fallen spontaneously after diagnosis.Hemoglobin, neutrophils and platelets remain normal.     He plans to get influenza vaccine when available. Also, could consider the new zoster vaccine (Shingrix).     Return to clinic in 1 year with laboratory studies.     Staffed with Dr Ferguson,  Emanuel Bailey MD  [facility] Onc Fellow    Oncology Attending    Patient seen and examined with the Oncology Fellow, Dr. Bailey. Agree with his findings, assessment and plan.    Armando Ferguson MD  Professor of Medicine  Oncology  [facility]  Office: [phone]  Clinic Fax: [phone]    CC: MD Armando Fields MD

## 2018-09-27 NOTE — MR AVS SNAPSHOT
After Visit Summary   9/27/2018    Lito Jensen    MRN: [id number]           Patient Information     Date Of Birth          1955        Visit Information        Provider Department      9/27/2018 3:30 PM Armando Ferguson MD [facility]        Today's Diagnoses     Chronic lymphocytic leukemia (H)    -  1       Follow-ups after your visit        Follow-up notes from your care team     Return in about 1 year (around 9/27/2019) for Physical Exam, Lab Work.      Your next 10 appointments already scheduled     Sep 26, 2019  3:00 PM CDT   [facility] Draw with  [facility] LAB DRAW   [facility] Lab Draw ([facility])    [address] [place] [address]  [address]  [facility] [address] [phone]            Sep 26, 2019  3:30 PM CDT   (Arrive by 3:15 PM)   Return Visit with Armando Ferguson MD   [facility] ([facility])    [address] [place] [address]  [address]  [facility] [address] [phone]              Who to contact     If you have questions or need follow up information about today's clinic visit or your schedule please contact [facility] directly at [phone].  Normal or non-critical lab and imaging results will be communicated to you by MyChart, letter or phone within 4 business days after the clinic has received the results. If you do not hear from us within 7 days, please contact the clinic through MyChart or phone. If you have a critical or abnormal lab result, we will notify you by phone as soon as possible.  Submit refill requests through [employer] or call your pharmacy and they will forward the refill request to us. Please allow 3 business days for your refill to be completed.          Additional Information About Your Visit        MyChart Information     [employer] lets you send messages to your doctor, view your test results, renew your prescriptions, schedule appointments  "and more. To sign up, go to [email or web address] . Click on \"Log in\" on the left side of the screen, which will take you to the Welcome page. Then click on \"Sign up Now\" on the right side of the page.     You will be asked to enter the access code listed below, as well as some personal information. Please follow the directions to create your username and password.     Your access code is: [email or web address]  Expires: 2018  6:31 AM     Your access code will  in 90 days. If you need help or a new code, please call your [place] clinic or [phone].        Care EveryWhere ID     This is your Care EveryWhere ID. This could be used by other organizations to access your [place] medical records  [id number]        Your Vitals Were     Pulse Temperature Respirations Height Pulse Oximetry BMI (Body Mass Index)    91 97.6  F (36.4  C) 16 1.702 m (5' 7.01\") 96% 30.89 kg/m2       Blood Pressure from Last 3 Encounters:   18 113/75   18 109/74   17 116/79    Weight from Last 3 Encounters:   18 89.5 kg (197 lb 4.8 oz)   18 92.4 kg (203 lb 11.3 oz)   17 86.9 kg (191 lb 9.6 oz)               Primary Care Provider Office Phone # Fax #    Armando Ferguson [phone]       [address] [place] [address]        Equal Access to Services     [facility]: Hadii jorge moreira hadasho Sojohn, waaxda luqadaha, qaybta kaalmada baylee gunter. So [facility] [phone].    ATENCIÓN: Si habla español, tiene a whipple disposición servicios gratuitos de asistencia lingüística. Llame al [phone].    We comply with applicable federal civil rights laws and [place] laws. We do not discriminate on the basis of race, color, national origin, age, disability, sex, sexual orientation, or gender identity.            Thank you!     Thank you for choosing [facility]  for your care. Our goal is always to provide you with " excellent care. Hearing back from our patients is one way we can continue to improve our services. Please take a few minutes to complete the written survey that you may receive in the mail after your visit with us. Thank you!             Your Updated Medication List - Protect others around you: Learn how to safely use, store and throw away your medicines at www.disposemymeds.org.          This list is accurate as of 9/27/18 11:59 PM.  Always use your most recent med list.                   Brand Name Dispense Instructions for use Diagnosis    aspirin 81 MG tablet      Take 1 tablet by mouth daily.    Chronic lymphoid leukemia, without mention of having achieved remission(204.10) (H)       citalopram 10 MG tablet    celeXA     Take 10 mg by mouth daily.    Chronic lymphoid leukemia, without mention of having achieved remission(204.10) (H)       flecainide 100 MG tablet    TAMBOCOR     Take 100 mg by mouth as needed    Chronic lymphocytic leukemia (H)       simvastatin 10 MG tablet    ZOCOR     Take 10 mg by mouth At Bedtime.    Chronic lymphoid leukemia, without mention of having achieved remission(204.10) (H)       UNABLE TO FIND      2 times daily. Flekainid 200 mg twice daily

## 2018-09-27 NOTE — NURSING NOTE
Chief Complaint   Patient presents with     Blood Draw     Labs drawn, vitals taken and patient checked into next appt.   Rosalie Stockton LPN

## 2018-09-28 LAB
ALBUMIN SERPL ELPH-MCNC: 4.3 G/DL (ref 3.7–5.1)
ALPHA1 GLOB SERPL ELPH-MCNC: 0.3 G/DL (ref 0.2–0.4)
ALPHA2 GLOB SERPL ELPH-MCNC: 0.6 G/DL (ref 0.5–0.9)
B-GLOBULIN SERPL ELPH-MCNC: 0.7 G/DL (ref 0.6–1)
GAMMA GLOB SERPL ELPH-MCNC: 0.7 G/DL (ref 0.7–1.6)
M PROTEIN SERPL ELPH-MCNC: 0 G/DL
PROT PATTERN SERPL ELPH-IMP: NORMAL

## 2018-09-28 NOTE — PROGRESS NOTES
"ONCOLOGY CLINIC NOTE  September 27, 2018    ONCOLOGY SUMMARY:  Mr. Jensen is a 63-year-old diagnosed with CLL/SLL in 1995 ,Hernandez stage 0. He has been monitored since diagnosis without the need for any therapeutic intervention. His hemoglobin, neutrophils and platelet count have remained stable. His lymphocyte count olga from diagnosis to 1999, when it peaked at135,500, and subsequently has slowly and steadily declined. More recently, it has been stable. He has had no known complications or other illnesses related to the CLL       INTERVAL HISTORY:  Mr. Jensen was last in clinic on 1/31/18 and has had no significant infections or other health-related issues, another uneventful year.  He reports no significant events with his health this year. He denies any changes in his weight, appetite. Denies fever, sweats. Denies any dizziness, sob, abdominal pain, nausea, vomiting, diarrhea, headache.      REVIEW OF SYSTEMS:  A 10-point review of systems is essentially negative.      MEDICATIONS:     Current Outpatient Prescriptions   Medication     aspirin 81 MG tablet     citalopram (CELEXA) 10 MG tablet     flecainide (TAMBOCOR) 100 MG tablet     simvastatin (ZOCOR) 10 MG tablet     UNABLE TO FIND     No current facility-administered medications for this visit.      ALLERGIES:  None reported.      PHYSICAL EXAMINATION:   /75 (BP Location: Left arm, Patient Position: Sitting, Cuff Size: Adult Regular)  Pulse 91  Temp 97.6  F (36.4  C)  Resp 16  Ht 1.702 m (5' 7.01\")  Wt 89.5 kg (197 lb 4.8 oz)  SpO2 96%  BMI 30.89 kg/m2  HEENT:  Anicteric.  No oropharyngeal masses.  Mucosa - moist and without lesion.   NECK:  No cervical/supraclavicular adenopathy.  CHEST:  Clear.   AXILLAE:  Negative.   HEART:  Regular rhythm, no murmur.   ABDOMEN:  Soft, nontender with active owel sounds.  Liiver is not enlarged and the spleen is not palpable.  No masses.  No inguinal nodes.   EXTREMITIES:  No lower extremity edema.  No " epitrochlear nodes.   SKIN:  No rashes.      LABORATORY:     Hemoglobin 15.1 grams percent with 18,000 WBCs (31% neutrophils, 64% lymphocytes) and 153,000 platelets.   Electrolytes, calcium, creatinine (1.14)  - normal.    Liver enzymes - normal.   Glucose 128 (non-fasting).      ASSESSMENT AND PLAN:  Chronic lymphocytic leukemia/small lymphocytic lymphoma, Hernandez stage 0. Mr. Jensen is doing well now about 23 years since diagnosis.  His absolute lymphocyte count remains stable after having fallen spontaneously after diagnosis.Hemoglobin, neutrophils and platelets remain normal.     He plans to get influenza vaccine when available. Also, could consider the new zoster vaccine (Shingrix).     Return to clinic in 1 year with laboratory studies.     Staffed with Dr Ferguson,  Emanuel Bailey MD  [facility] Onc Fellow    Oncology Attending    Patient seen and examined with the Oncology Fellow, Dr. Bailey. Agree with his findings, assessment and plan.    Armando Ferguson MD  Professor of Medicine  Oncology  [facility]  Office: [phone]  Clinic Fax: [phone]    CC: Elver Bourgeois MD

## 2021-02-01 NOTE — TELEPHONE ENCOUNTER
REFERRAL INFORMATION:    Referring Provider:  N/A    Referring Clinic:  N/A    Reason for Visit/Diagnosis: Inguinal hernia        FUTURE VISIT INFORMATION:    Appointment Date: 2/18/2021    Appointment Time: 4 PM     NOTES RECORD STATUS  DETAILS   OFFICE NOTE from Referring Provider N/A    OFFICE NOTE from Other Specialists Care Everywhere 1/27/2021 Telephone with Dr. Elver Bourgeois ([facility] DISCHARGE SUMMARY/ ED VISITS  N/A    OPERATIVE REPORT N/A    ENDOSCOPY (EGD)  N/A    PERTINENT LABS Care Everywhere    PATHOLOGY REPORTS (RELATED) N/A    IMAGING (CT, MRI, US, XR)  N/A      2/12/2021 4pm Called and LVM for Pt; called to inquire about US image/report. Jung

## 2021-02-03 ENCOUNTER — DOCUMENTATION ONLY (OUTPATIENT)
Dept: CARE COORDINATION | Facility: CLINIC | Age: 66
End: 2021-02-03

## 2021-02-17 ENCOUNTER — PATIENT OUTREACH (OUTPATIENT)
Dept: SURGERY | Facility: CLINIC | Age: 66
End: 2021-02-17

## 2021-02-17 NOTE — PROGRESS NOTES
Patient Telephone Reminder Call    Date of call:  02/17/21  Phone numbers:  Cell number on file:    Telephone Information:   Mobile [phone]       Reached patient/confirmed appointment:  Yes  Appointment with:   Dr. Robbie Martins  Reason for visit:  Hernia. Patient will bring US and report from [facility] (report is in Bengali).

## 2021-02-18 ENCOUNTER — PRE VISIT (OUTPATIENT)
Dept: SURGERY | Facility: CLINIC | Age: 66
End: 2021-02-18

## 2021-02-18 ENCOUNTER — OFFICE VISIT (OUTPATIENT)
Dept: SURGERY | Facility: CLINIC | Age: 66
End: 2021-02-18
Payer: COMMERCIAL

## 2021-02-18 VITALS
HEIGHT: 66 IN | SYSTOLIC BLOOD PRESSURE: 123 MMHG | OXYGEN SATURATION: 97 % | WEIGHT: 202 LBS | DIASTOLIC BLOOD PRESSURE: 95 MMHG | HEART RATE: 82 BPM | BODY MASS INDEX: 32.47 KG/M2

## 2021-02-18 DIAGNOSIS — K40.20 BILATERAL INGUINAL HERNIA WITHOUT OBSTRUCTION OR GANGRENE, RECURRENCE NOT SPECIFIED: Primary | ICD-10-CM

## 2021-02-18 DIAGNOSIS — K42.9 UMBILICAL HERNIA WITHOUT OBSTRUCTION AND WITHOUT GANGRENE: ICD-10-CM

## 2021-02-18 PROCEDURE — 99203 OFFICE O/P NEW LOW 30 MIN: CPT | Performed by: SURGERY

## 2021-02-18 RX ORDER — ACETAMINOPHEN 500 MG
500-1000 TABLET ORAL
COMMUNITY

## 2021-02-18 RX ORDER — CEFAZOLIN SODIUM 2 G/50ML
2 SOLUTION INTRAVENOUS SEE ADMIN INSTRUCTIONS
Status: CANCELLED | OUTPATIENT
Start: 2021-02-18

## 2021-02-18 RX ORDER — FLUTICASONE PROPIONATE 50 MCG
2 SPRAY, SUSPENSION (ML) NASAL
COMMUNITY
Start: 2019-05-06

## 2021-02-18 RX ORDER — CEFAZOLIN SODIUM 2 G/50ML
2 SOLUTION INTRAVENOUS
Status: CANCELLED | OUTPATIENT
Start: 2021-02-18

## 2021-02-18 ASSESSMENT — MIFFLIN-ST. JEOR: SCORE: 1641.27

## 2021-02-18 ASSESSMENT — PAIN SCALES - GENERAL: PAINLEVEL: NO PAIN (0)

## 2021-02-18 NOTE — LETTER
Date:April 16, 2021      Patient was self referred, no letter generated. Do not send.        [facility] [place] [facility] Information

## 2021-02-18 NOTE — PROGRESS NOTES
Lito Jensen is a 65 year old male with a 2 month history of a right inguinal mass with the following symptoms of lump.    Onset did not occur with lifting.  Obstructive symptoms:  no  Urinary difficulties:  no  Chronic cough: no  Constipation:  no  Current level of activity:  Low, walks, at home with family,  for [employer]. [place].    Past medical and surgical history, medications, allergies, family history, and social history were reviewed with the patient. CLL, RIH open repair 1988  ROS: 10 point review of systems negative except noted in HPI  PHYSICAL EXAM  General appearance- healthy, alert, and in no distress.  Skin- Skin color and turgor normal.  No obvious rashes.  Neck- Neck is supple without obvious adenopathy.  Lungs- Respiratory effort unlabored.  Gait- Normal.  Abdomen - soft non distended, non tender with small umbilical hernia  BIH, R > L.  Impression:  Inguinal hernia, bilateral and umb hernia.  Recommendation:  Laparoscopic bilateral Inguinal Hernioplasty Robot assisted and open mesh umb    A full discussion regarding the alternatives, risks, goals, and potential complications for this surgery was completed today.  The patient understood I would use non recalled mesh and  that the potential problems included but are not limited to:  Infection, bleeding, hematoma, seroma, recurrence, injury to nerve/muscle or involved testicle(if male), ischemic orchitis(if male), and chronic pain.    The patient verbally expressed understanding, was given the opportunity for questions, and gives full informed consent for the procedure.      Today the patient was instructed on the need for a preoperative H&P, NPO status prior to surgery, and the need to stop anticoagulants prior to surgery.  Additional educational material, soap, and instructions will be mailed out to the patients home.    The total time spent with this patient was 30 minutes.  The total time was spent on the  date of encounter doing chart review, history and physical, dressing changes, documentation, patient education, and any further activity as noted above.

## 2021-02-18 NOTE — LETTER
2/18/2021       RE: Lito Jensen  [address]  [facility] [address]     Dear Colleague,    Thank you for referring your patient, Lito Jensen, to the [facility] [place] [facility]. Please see a copy of my visit note below.    Lito Jensen is a 65 year old male with a 2 month history of a right inguinal mass with the following symptoms of lump.    Onset did not occur with lifting.  Obstructive symptoms:  no  Urinary difficulties:  no  Chronic cough: no  Constipation:  no  Current level of activity:  Low, walks, at home with family,  for [employer]. [place].    Past medical and surgical history, medications, allergies, family history, and social history were reviewed with the patient. EZIO MISTRY open repair 1988  ROS: 10 point review of systems negative except noted in HPI  PHYSICAL EXAM  General appearance- healthy, alert, and in no distress.  Skin- Skin color and turgor normal.  No obvious rashes.  Neck- Neck is supple without obvious adenopathy.  Lungs- Respiratory effort unlabored.  Gait- Normal.  Abdomen - soft non distended, non tender with small umbilical hernia  BIH, R > L.  Impression:  Inguinal hernia, bilateral and umb hernia.  Recommendation:  Laparoscopic bilateral Inguinal Hernioplasty Robot assisted and open mesh umb    A full discussion regarding the alternatives, risks, goals, and potential complications for this surgery was completed today.  The patient understood I would use non recalled mesh and  that the potential problems included but are not limited to:  Infection, bleeding, hematoma, seroma, recurrence, injury to nerve/muscle or involved testicle(if male), ischemic orchitis(if male), and chronic pain.    The patient verbally expressed understanding, was given the opportunity for questions, and gives full informed consent for the procedure.      Today  the patient was instructed on the need for a preoperative H&P, NPO status prior to surgery, and the need to stop anticoagulants prior to surgery.  Additional educational material, soap, and instructions will be mailed out to the patients home.    The total time spent with this patient was 30 minutes.  The total time was spent on the date of encounter doing chart review, history and physical, dressing changes, documentation, patient education, and any further activity as noted above.                    Again, thank you for allowing me to participate in the care of your patient.      Sincerely,    Robbie Martins MD

## 2021-02-18 NOTE — PATIENT INSTRUCTIONS
You met with Dr. Robbie Martins.      Today's visit instructions:    Reminder:  Surgery Requirements  1. Your surgery will be at [facility]- 5th Floor  2. You will need to arrive 1 1/2 to 2 hours early based on the location of your surgery.  3. You will need someone to drive you home (over 18 years old) and stay with you for 24 hours after the procedure  4. You will need a preop physical with your regular doctor (or PAC if requested by your surgeon) within 30 days of surgery- closer is always better  5. Stop any blood thinners, vitamins, minerals, or herbal supplements 5 days before surgery.  If you are taking a prescribed blood thinner please let us know for specific instructions  6. Fasting- a nurse from [facility] will call you 1-2 days before surgery to confirm your procedure and tell you when to stop eating and drinking  7. Wash with the soap the night before surgery and morning of surgery. See instructions in the Surgery Packet.  8. If you would like a procedure estimate please call Kamilla RODRIGUEZ Financial Counselor at [phone] or [phone].    At this time, any patient that does not have COVID-19 testing within 4 days of surgery and results available to the surgeon and anesthesia team before the procedure may have their procedure postponed or canceled. We do this to keep our patients, providers, and employees safe. If you decline to test, then you will need to contact your surgeon to determine when or if your procedure will still take place.    OR    We highly encourage patients to get tested for COVID-19 at one of our designated [facility] testing sites. We process the tests in our lab, which allows us to get the results quickly. If you choose to get tested at a non-[facility] location, you will need to contact your primary care provider to make those arrangements and ensure the results are available to your surgeon before you arrive for your procedure. If  we do not receive the results in time, your procedure may be postponed or canceled. Please make sure your test is collected 3-days prior to your procedure date. The results will need to get faxed to [phone].     If you have questions please contact Ashley SAUCEDA during regular clinic hours, Monday through Friday 7:30 AM - 4:00 PM, or you can contact us via [facility] at anytime.       If you have urgent needs after-hours, weekends, or holidays please call the hospital at [phone] and ask to speak with our on-call General Surgery Team.    Appointment [phone], option #1   Nurse Advice (Ashley): [phone]   Surgery Scheduler (Fariba): [phone]  Fax: [phone]      After Your Robotic Inguinal Hernia Repair         Incision care     Remove the bandage the day after surgery, but leave the medical tape (Steri-Strips) or glue in place. These will loosen and fall off on their own 5 to 7 days after surgery.     You may take a shower the day after surgery. Carefully wash your incision with soap and water. Do not submerge yourself in water (bath, whirlpool, hot tub, pool, lake) for 14 days after surgery.       Always wash your hands before touching your incisions or removing bandages.     It is not unusual to form a collection of fluid or blood under your incision that may feel firm or squishy- it can take several weeks to months for your body to reabsorb it.  At times, it may even drain.  If that should happen keep the area clean with soap, water,  and cover with a clean gauze dressing. You can change this daily or as needed.     Other medicines     Wait to start aspirin or blood thinners until the day after surgery. You can continue your regular medicines at your normal time the day after surgery.     Your pain medicine may cause constipation (hard, dry stools). To help with this, take the stool softener your doctor gave you or an over-the-counter stool softener or laxative. You can stop taking this  when you are no longer taking pain medicine and your bowel movements are back to normal.      For pain or discomfort     Take the narcotic pain medicine your doctor gave you as needed and as instructed on the bottle. If you prefer to use over-the-counter medication, use acetaminophen (Tylenol) or ibuprofen (Advil, Motrin) as instructed on the box. Do not take Tylenol if it is in your narcotic pain medication.     Use an ice pack on your groin for 20 minutes at a time as needed for the first 24 hours. Be sure to protect your skin by putting a cloth between the ice pack and your skin.     After 24 hours you can switch to heat for 20 minutes as needed. Be sure to protect your skin by putting a cloth between the heat pack and your skin.     It is normal to feel a lump in your groin after surgery. This lump may take up to 8 weeks to go away.      Activities     No driving until you feel it s safe to do so. Don t drive while taking narcotic pain medicine.     You can return to your other activities as normal, no lifting in excess of 15-20 pounds for 3 weeks.      Special equipment     If we gave you an athletic supporter, wear this for the first 3 days after surgery. You can wear it longer than this if you wish.      Diet     You can eat your regular meals after surgery.      When to call the doctor   Call your doctor if you have:     A fever above 101 F (38.3 C) (taken under the tongue), or a fever or chills lasting more than a day.     Redness at the incision site.     Any fluid or blood draining from the incision, especially if it smells bad.      Severe pain that doesn t improve with pain medicine.      Go to the emergency room if:   You can t urinate (pee) or feel pressure when you urinate. We will place a small, thin tube (catheter) to empty your bladder. This needs to stay in place for at least 2 days.      We will call you 2 to 4 days after surgery to review this handout, answer questions and help arrange  after-surgery care. If you have questions or concerns, please call [phone] during regular office hours. If you need to call after business hours, call [phone] and ask to page the surgeon on-call.

## 2021-02-18 NOTE — NURSING NOTE
"Chief Complaint   Patient presents with     Consult     New hernia appt.       Vitals:    02/18/21 1542   BP: (!) 123/95   BP Location: Left arm   Patient Position: Sitting   Cuff Size: Adult Large   Pulse: 82   SpO2: 97%   Weight: 91.6 kg (202 lb)   Height: 1.68 m (5' 6.14\")       Body mass index is 32.46 kg/m .                            DEONNA WETZEL, EMT    "

## 2021-02-18 NOTE — NURSING NOTE
Pre and Post op Patient Education/Teaching Flowsheet  Relevant Diagnosis:  Hernia  Teaching Topic:  Pre and post op teaching  Person(s) Involved in teaching:  Patient     Motivation Level:  Asks Questions:  Yes  Eager to Learn:  Yes  Cooperative:  Yes  Receptive (willing/able to accept information):  Yes  Any cultural factors/Jewish beliefs that may influence understanding or compliance?  No    Patient/caregiver/family demonstrates understanding of the following:  Reason for the appointment, diagnosis, and treatment plan:  Yes  Patient demonstrates understanding of the following:  Pre-op bowel prep:  No  Post-op pain management recommendations (medications, ice compress, binder/athletic supporter (if applicable), etc.:  Yes  Inguinal hernia patients:  Post-op urinary retention- discussed signs/symptoms and visit to ER for Delgado catheter placement and to stay in place for at least 48 hours:  Yes  Restrictions:  Yes  Medications to take the day of surgery:  Per PCP  Blood thinner medications discussed and when to stop (if applicable):  Yes  Wound care:  Yes  Diabetes medication management (if applicable):  Per PCP  Which situations necessitate calling provider and whom to contact:  Discussed how to contact the hospital, nurse, and clinic scheduling staff if necessary      Date and time of surgery:  TBD  Location of surgery: [facility] [place]- 5th Floor  History and Physical and any other testing necessary prior to surgery:  Yes  Required time line for completion of History and Physical and any pre-op testing:  Yes  Discuss need for someone to drive patient home and stay with them for 24 hours:  Yes  Pre-op showering/scrub information with Surgical Scrub:  Yes  NPO Guidelines:  NPO per Anesthesia Guidelines  COVID-19 Testing:  Yes    Infection Prevention: Patient demonstrates understanding of the following:  Patient instructed on hand hygiene:  Yes  Surgical procedure site care will be  taught and will be reviewed at the time of discharge  Signs and symptoms of infection taught:  Yes  Wound care reviewed and will be taught at the time of discharge  Central venous catheter care will be taught at the time of discharge (if applicable)    Post-op follow-up:  Instructional materials used/given/mailed:  [facility] Booklet, post op teaching sheet, Map, Soap, and arrival/location information    Surgical instructions mailed to patient

## 2021-02-19 ENCOUNTER — TELEPHONE (OUTPATIENT)
Dept: SURGERY | Facility: CLINIC | Age: 66
End: 2021-02-19

## 2021-02-19 PROBLEM — K42.9 UMBILICAL HERNIA WITHOUT OBSTRUCTION AND WITHOUT GANGRENE: Status: ACTIVE | Noted: 2021-02-19

## 2021-02-19 PROBLEM — K40.20 BILATERAL INGUINAL HERNIA WITHOUT OBSTRUCTION OR GANGRENE, RECURRENCE NOT SPECIFIED: Status: ACTIVE | Noted: 2021-02-19

## 2021-02-19 NOTE — TELEPHONE ENCOUNTER
Spoke with patient about scheduling his outpatient robotic surgery with Dr. Martins, which can be done at the [facility], [facility], or [facility], per SOHAIL Ramirez.    Patient does not want surgery on a Monday, so the [facility] is not an option.    Patient would like surgery on a Thursday or Friday, so either the [facility] or [facility] would work.    The [facility] OR is booked out to Thurs 3/18 or Fri 3/19/2021. Patient would prefer a Friday, and does not want to wait that long to have surgery if at all possible.    I spoke with Loida at the [facility] Control Desk, she will try to secure [facility] robotic time for patient's case on either Thurs 3/11 or  3/12/2021 - she said she'd send an email to Dr. Martins, his nurses, and me to update us on [facility] Robotic time for Dr. Martins - including patient's case to hopefully be scheduled on 3/12/2021.    Spoke with patient again, updated him of the above. Made plans to call patient early next week to update him - hopefully with a secured surgery date at the [facility].

## 2021-02-22 ENCOUNTER — TRANSFERRED RECORDS (OUTPATIENT)
Dept: HEALTH INFORMATION MANAGEMENT | Facility: CLINIC | Age: 66
End: 2021-02-22

## 2021-02-22 LAB
CHOLEST SERPL-MCNC: 169 MG/DL (ref 0–199)
CREAT SERPL-MCNC: 1.05 MG/DL (ref 0.73–1.18)
GFR SERPL CREATININE-BSD FRML MDRD: >60 ML/MIN/1.73M2
GLUCOSE SERPL-MCNC: 90 MG/DL (ref 70–100)
HDLC SERPL-MCNC: 38 MG/DL
LDLC SERPL CALC-MCNC: 110 MG/DL
NONHDLC SERPL-MCNC: 131 MG/DL
POTASSIUM SERPL-SCNC: 4.3 MMOL/L (ref 3.5–5.1)
TRIGL SERPL-MCNC: 106 MG/DL
TSH SERPL-ACNC: 2.29 UIU/ML (ref 0.3–4.5)

## 2021-03-01 DIAGNOSIS — C91.10 CHRONIC LYMPHOCYTIC LEUKEMIA (H): Primary | ICD-10-CM

## 2021-03-01 DIAGNOSIS — C91.10 CHRONIC LYMPHOCYTIC LEUKEMIA (H): ICD-10-CM

## 2021-03-01 LAB
ALBUMIN SERPL-MCNC: 3.9 G/DL (ref 3.4–5)
ALP SERPL-CCNC: 86 U/L (ref 40–150)
ALT SERPL W P-5'-P-CCNC: 30 U/L (ref 0–70)
ANION GAP SERPL CALCULATED.3IONS-SCNC: 4 MMOL/L (ref 3–14)
AST SERPL W P-5'-P-CCNC: 18 U/L (ref 0–45)
BASOPHILS # BLD AUTO: 0.2 10E9/L (ref 0–0.2)
BASOPHILS NFR BLD AUTO: 1 %
BILIRUB SERPL-MCNC: 0.5 MG/DL (ref 0.2–1.3)
BUN SERPL-MCNC: 22 MG/DL (ref 7–30)
CALCIUM SERPL-MCNC: 9 MG/DL (ref 8.5–10.1)
CHLORIDE SERPL-SCNC: 110 MMOL/L (ref 94–109)
CO2 SERPL-SCNC: 29 MMOL/L (ref 20–32)
CREAT SERPL-MCNC: 1.05 MG/DL (ref 0.66–1.25)
DIFFERENTIAL METHOD BLD: ABNORMAL
EOSINOPHIL # BLD AUTO: 0.4 10E9/L (ref 0–0.7)
EOSINOPHIL NFR BLD AUTO: 2 %
ERYTHROCYTE [DISTWIDTH] IN BLOOD BY AUTOMATED COUNT: 12.8 % (ref 10–15)
GFR SERPL CREATININE-BSD FRML MDRD: 73 ML/MIN/{1.73_M2}
GLUCOSE SERPL-MCNC: 92 MG/DL (ref 70–99)
HCT VFR BLD AUTO: 45.8 % (ref 40–53)
HGB BLD-MCNC: 14.9 G/DL (ref 13.3–17.7)
LDH SERPL L TO P-CCNC: 172 U/L (ref 85–227)
LYMPHOCYTES # BLD AUTO: 10.7 10E9/L (ref 0.8–5.3)
LYMPHOCYTES NFR BLD AUTO: 55 %
MCH RBC QN AUTO: 29.6 PG (ref 26.5–33)
MCHC RBC AUTO-ENTMCNC: 32.5 G/DL (ref 31.5–36.5)
MCV RBC AUTO: 91 FL (ref 78–100)
MONOCYTES # BLD AUTO: 0.4 10E9/L (ref 0–1.3)
MONOCYTES NFR BLD AUTO: 2 %
NEUTROPHILS # BLD AUTO: 7.8 10E9/L (ref 1.6–8.3)
NEUTROPHILS NFR BLD AUTO: 40 %
PLATELET # BLD AUTO: 171 10E9/L (ref 150–450)
PLATELET # BLD EST: ABNORMAL 10*3/UL
POTASSIUM SERPL-SCNC: 4.2 MMOL/L (ref 3.4–5.3)
PROT SERPL-MCNC: 6.8 G/DL (ref 6.8–8.8)
RBC # BLD AUTO: 5.03 10E12/L (ref 4.4–5.9)
RBC MORPH BLD: NORMAL
SODIUM SERPL-SCNC: 143 MMOL/L (ref 133–144)
URATE SERPL-MCNC: 6.5 MG/DL (ref 3.5–7.2)
WBC # BLD AUTO: 19.5 10E9/L (ref 4–11)

## 2021-03-01 PROCEDURE — 80053 COMPREHEN METABOLIC PANEL: CPT | Performed by: PATHOLOGY

## 2021-03-01 PROCEDURE — 83615 LACTATE (LD) (LDH) ENZYME: CPT | Performed by: PATHOLOGY

## 2021-03-01 PROCEDURE — 85025 COMPLETE CBC W/AUTO DIFF WBC: CPT | Performed by: PATHOLOGY

## 2021-03-01 PROCEDURE — 36415 COLL VENOUS BLD VENIPUNCTURE: CPT | Performed by: PATHOLOGY

## 2021-03-01 PROCEDURE — 84550 ASSAY OF BLOOD/URIC ACID: CPT | Performed by: PATHOLOGY

## 2021-03-03 DIAGNOSIS — Z11.59 ENCOUNTER FOR SCREENING FOR OTHER VIRAL DISEASES: ICD-10-CM

## 2021-03-11 ENCOUNTER — ONCOLOGY VISIT (OUTPATIENT)
Dept: ONCOLOGY | Facility: CLINIC | Age: 66
End: 2021-03-11
Attending: INTERNAL MEDICINE
Payer: COMMERCIAL

## 2021-03-11 VITALS
TEMPERATURE: 97.9 F | BODY MASS INDEX: 31.89 KG/M2 | DIASTOLIC BLOOD PRESSURE: 83 MMHG | OXYGEN SATURATION: 97 % | SYSTOLIC BLOOD PRESSURE: 132 MMHG | WEIGHT: 198.4 LBS | HEART RATE: 73 BPM

## 2021-03-11 DIAGNOSIS — C91.10 CHRONIC LYMPHOCYTIC LEUKEMIA (H): Primary | ICD-10-CM

## 2021-03-11 PROCEDURE — 99214 OFFICE O/P EST MOD 30 MIN: CPT | Performed by: INTERNAL MEDICINE

## 2021-03-11 PROCEDURE — G0463 HOSPITAL OUTPT CLINIC VISIT: HCPCS

## 2021-03-11 ASSESSMENT — PAIN SCALES - GENERAL: PAINLEVEL: NO PAIN (0)

## 2021-03-11 NOTE — PROGRESS NOTES
Hematology Clinic note  In person visit    PROBLEM LIST:   1. CLL, stage 0 diagnosed 1995  2. Hyperlipidemia  3.  Paroxysmal atrial tachycardia    Interim History:  Mr. Abraham is here for f/up of CLL. He was last seen in hematology/oncology clinic 9/27/2018 by Dr. Armando Ferguson, who has retired.  Overall he has been feeling well.  However he has noticed intermittent pain in his right groin and sometimes in the left groin, especially when he is walking a great distance.  Occasion there is a lump there.  He has seen a surgeon and been diagnosed with bilateral inguinal hernias and also an umbilical hernia, and will have surgery in a couple weeks.  He has not noted any adenopathy.  No fevers, chills, sweats, anorexia,.  Overall he is feeling quite well.    He has some seasonal allergies.    He has had a first of 2 Covid vaccinations, and had no problem with the injection.    He has been living in [facility] for the past 2 years, which is part of why he did not have follow-up last year.  He works for [employer], developing cardiac pacemakers.    PHYSICAL EXAMINATION:  /83   Pulse 73   Temp 97.9  F (36.6  C) (Oral)   Wt 90 kg (198 lb 6.4 oz)   SpO2 97%   BMI 31.89 kg/m      General appearance:  Patient is 65 year old man in no acute distress.     HEENT:  No pallor, icterus, or mucositis.  No thyromegaly.   Lymph nodes:  No cervical, supraclavicular, axillary, or inguinal lymphadenopathy.   Lungs:  Clear to auscultation bilaterally.   Heart:  Regular rate and rhythm; no S3 S4 or murmer.     Abdomen:  Positive bowel sounds, soft and nontender, nondistended.  No hepatomegaly. No splenomegaly appreciated.    Extremities:  No joint swelling or tenderness.  No ankle edema.     Skin:  No rash, no petechiae or ecchymoses.      Labs:  Results for ELINA ABRAHAM (MRN [id number]) as of 3/11/2021 09:11   Ref. Range 3/1/2021 13:59   Sodium Latest Ref Range: 133 - 144 mmol/L 143   Potassium Latest Ref  Range: 3.4 - 5.3 mmol/L 4.2   Chloride Latest Ref Range: 94 - 109 mmol/L 110 (H)   Carbon Dioxide Latest Ref Range: 20 - 32 mmol/L 29   Urea Nitrogen Latest Ref Range: 7 - 30 mg/dL 22   Creatinine Latest Ref Range: 0.66 - 1.25 mg/dL 1.05   GFR Estimate Latest Ref Range: >60 mL/min/1.73_m2 73   GFR Estimate If Black Latest Ref Range: >60 mL/min/1.73_m2 85   Calcium Latest Ref Range: 8.5 - 10.1 mg/dL 9.0   Anion Gap Latest Ref Range: 3 - 14 mmol/L 4   Albumin Latest Ref Range: 3.4 - 5.0 g/dL 3.9   Protein Total Latest Ref Range: 6.8 - 8.8 g/dL 6.8   Bilirubin Total Latest Ref Range: 0.2 - 1.3 mg/dL 0.5   Alkaline Phosphatase Latest Ref Range: 40 - 150 U/L 86   ALT Latest Ref Range: 0 - 70 U/L 30   AST Latest Ref Range: 0 - 45 U/L 18   Lactate Dehydrogenase Latest Ref Range: 85 - 227 U/L 172   Uric Acid Latest Ref Range: 3.5 - 7.2 mg/dL 6.5   Glucose Latest Ref Range: 70 - 99 mg/dL 92   WBC Latest Ref Range: 4.0 - 11.0 10e9/L 19.5 (H)   Hemoglobin Latest Ref Range: 13.3 - 17.7 g/dL 14.9   Hematocrit Latest Ref Range: 40.0 - 53.0 % 45.8   Platelet Count Latest Ref Range: 150 - 450 10e9/L 171   RBC Count Latest Ref Range: 4.4 - 5.9 10e12/L 5.03   MCV Latest Ref Range: 78 - 100 fl 91   MCH Latest Ref Range: 26.5 - 33.0 pg 29.6   MCHC Latest Ref Range: 31.5 - 36.5 g/dL 32.5   RDW Latest Ref Range: 10.0 - 15.0 % 12.8   Diff Method Unknown Manual Differential   % Neutrophils Latest Units: % 40.0   % Lymphocytes Latest Units: % 55.0   % Monocytes Latest Units: % 2.0   % Eosinophils Latest Units: % 2.0   % Basophils Latest Units: % 1.0   Absolute Neutrophil Latest Ref Range: 1.6 - 8.3 10e9/L 7.8   Absolute Lymphocytes Latest Ref Range: 0.8 - 5.3 10e9/L 10.7 (H)   Absolute Monocytes Latest Ref Range: 0.0 - 1.3 10e9/L 0.4   Absolute Eosinophils Latest Ref Range: 0.0 - 0.7 10e9/L 0.4   Absolute Basophils Latest Ref Range: 0.0 - 0.2 10e9/L 0.2     Assessment and recommendation: Stage 0 CLL/SLL diagnosed in 1995.  Interestingly his  white count over the years went as high as 135,000, but then is gradually come back down.  He has never had any treatment.  His hemoglobin and platelet count are good.  He does not have any peripheral adenopathy and no symptoms to suggest that he needs any imaging, and at this time no indication for any treatment of CLL.      I recommend that he continue with the once a year CBC and clinic visit.  He prefers to make the appointment few months prior to the annual visit.    Rebeca Webber MD  Hematology

## 2021-03-11 NOTE — LETTER
3/11/2021         RE: Lito Jensen  [address]  [facility] [facility] [address]        Dear Colleague,    Thank you for referring your patient, Lito Jensen, to the [facility]. Please see a copy of my visit note below.    Hematology Clinic note  In person visit    PROBLEM LIST:   1. CLL, stage 0 diagnosed 1995  2. Hyperlipidemia  3.  Paroxysmal atrial tachycardia    Interim History:  Mr. Jensen is here for f/up of CLL. He was last seen in hematology/oncology clinic 9/27/2018 by Dr. Armando Ferguson, who has retired.  Overall he has been feeling well.  However he has noticed intermittent pain in his right groin and sometimes in the left groin, especially when he is walking a great distance.  Occasion there is a lump there.  He has seen a surgeon and been diagnosed with bilateral inguinal hernias and also an umbilical hernia, and will have surgery in a couple weeks.  He has not noted any adenopathy.  No fevers, chills, sweats, anorexia,.  Overall he is feeling quite well.    He has some seasonal allergies.    He has had a first of 2 Covid vaccinations, and had no problem with the injection.    He has been living in [place] for the past 2 years, which is part of why he did not have follow-up last year.  He works for [employer], developing cardiac pacemakers.    PHYSICAL EXAMINATION:  /83   Pulse 73   Temp 97.9  F (36.6  C) (Oral)   Wt 90 kg (198 lb 6.4 oz)   SpO2 97%   BMI 31.89 kg/m      General appearance:  Patient is 65 year old man in no acute distress.     HEENT:  No pallor, icterus, or mucositis.  No thyromegaly.   Lymph nodes:  No cervical, supraclavicular, axillary, or inguinal lymphadenopathy.   Lungs:  Clear to auscultation bilaterally.   Heart:  Regular rate and rhythm; no S3 S4 or murmer.     Abdomen:  Positive bowel sounds, soft and nontender, nondistended.  No hepatomegaly. No splenomegaly appreciated.    Extremities:  No joint swelling  or tenderness.  No ankle edema.     Skin:  No rash, no petechiae or ecchymoses.      Labs:  Results for ELINA ABRAHAM (MRN [id number]) as of 3/11/2021 09:11   Ref. Range 3/1/2021 13:59   Sodium Latest Ref Range: 133 - 144 mmol/L 143   Potassium Latest Ref Range: 3.4 - 5.3 mmol/L 4.2   Chloride Latest Ref Range: 94 - 109 mmol/L 110 (H)   Carbon Dioxide Latest Ref Range: 20 - 32 mmol/L 29   Urea Nitrogen Latest Ref Range: 7 - 30 mg/dL 22   Creatinine Latest Ref Range: 0.66 - 1.25 mg/dL 1.05   GFR Estimate Latest Ref Range: >60 mL/min/1.73_m2 73   GFR Estimate If Black Latest Ref Range: >60 mL/min/1.73_m2 85   Calcium Latest Ref Range: 8.5 - 10.1 mg/dL 9.0   Anion Gap Latest Ref Range: 3 - 14 mmol/L 4   Albumin Latest Ref Range: 3.4 - 5.0 g/dL 3.9   Protein Total Latest Ref Range: 6.8 - 8.8 g/dL 6.8   Bilirubin Total Latest Ref Range: 0.2 - 1.3 mg/dL 0.5   Alkaline Phosphatase Latest Ref Range: 40 - 150 U/L 86   ALT Latest Ref Range: 0 - 70 U/L 30   AST Latest Ref Range: 0 - 45 U/L 18   Lactate Dehydrogenase Latest Ref Range: 85 - 227 U/L 172   Uric Acid Latest Ref Range: 3.5 - 7.2 mg/dL 6.5   Glucose Latest Ref Range: 70 - 99 mg/dL 92   WBC Latest Ref Range: 4.0 - 11.0 10e9/L 19.5 (H)   Hemoglobin Latest Ref Range: 13.3 - 17.7 g/dL 14.9   Hematocrit Latest Ref Range: 40.0 - 53.0 % 45.8   Platelet Count Latest Ref Range: 150 - 450 10e9/L 171   RBC Count Latest Ref Range: 4.4 - 5.9 10e12/L 5.03   MCV Latest Ref Range: 78 - 100 fl 91   MCH Latest Ref Range: 26.5 - 33.0 pg 29.6   MCHC Latest Ref Range: 31.5 - 36.5 g/dL 32.5   RDW Latest Ref Range: 10.0 - 15.0 % 12.8   Diff Method Unknown Manual Differential   % Neutrophils Latest Units: % 40.0   % Lymphocytes Latest Units: % 55.0   % Monocytes Latest Units: % 2.0   % Eosinophils Latest Units: % 2.0   % Basophils Latest Units: % 1.0   Absolute Neutrophil Latest Ref Range: 1.6 - 8.3 10e9/L 7.8   Absolute Lymphocytes Latest Ref Range: 0.8 - 5.3 10e9/L 10.7 (H)    Absolute Monocytes Latest Ref Range: 0.0 - 1.3 10e9/L 0.4   Absolute Eosinophils Latest Ref Range: 0.0 - 0.7 10e9/L 0.4   Absolute Basophils Latest Ref Range: 0.0 - 0.2 10e9/L 0.2     Assessment and recommendation: Stage 0 CLL/SLL diagnosed in 1995.  Interestingly his white count over the years went as high as 135,000, but then is gradually come back down.  He has never had any treatment.  His hemoglobin and platelet count are good.  He does not have any peripheral adenopathy and no symptoms to suggest that he needs any imaging, and at this time no indication for any treatment of CLL.      I recommend that he continue with the once a year CBC and clinic visit.  He prefers to make the appointment few months prior to the annual visit.    Rebeca Webber MD  Hematology

## 2021-03-11 NOTE — NURSING NOTE
"Oncology Rooming Note    March 11, 2021 8:51 AM   Lito Jensen is a 65 year old male who presents for:    Chief Complaint   Patient presents with     Oncology Clinic Visit     CLL     Initial Vitals: /83   Pulse 73   Temp 97.9  F (36.6  C) (Oral)   Wt 90 kg (198 lb 6.4 oz)   SpO2 97%   BMI 31.89 kg/m   Estimated body mass index is 31.89 kg/m  as calculated from the following:    Height as of 2/18/21: 1.68 m (5' 6.14\").    Weight as of this encounter: 90 kg (198 lb 6.4 oz). Body surface area is 2.05 meters squared.  No Pain (0) Comment: Data Unavailable   No LMP for male patient.  Allergies reviewed: Yes  Medications reviewed: Yes    Medications: Medication refills not needed today.  Pharmacy name entered into [facility]:    [facility] - [place], [place] - [address] [facility] [address] [facility] [employer] - [place], [place] - [address]    Clinical concerns: none       Suzanne Méndez CMA            "

## 2021-03-13 NOTE — TELEPHONE ENCOUNTER
On 2/24/2021, spoke with patient, completed the following scheduling:    Your surgery is scheduled:     Date: March 19, 2021  ________________________________     Time: 12:00 PM*  ________________________________     Please arrive at:  10:30 AM*  ______________________     Surgeon's Name: Dr. Robbie Martins  _______________________  Adult  required on surgery date      Pre-Op Physical Fax Numbers:         [facility] Fax: [phone] / Phone:  [phone]         Your surgery is located at:  [facility]  [address] [place] [address]  [phone]       *Times are tentative and may change. You can expect a call from the pre-admission nurses to confirm arrival and surgery start time if the times should change.        Pre-operative Physical appointment:   - to be scheduled / completed by Lito with your primary care provider or a partner within 30 days before your surgery date     Pre-operative COVID-19 Test:   Pre-procedure asymptomatic COVID PCR:   3/17/2021 at 12:00 PM                                                                                  [facility]                                                                          [facility] [place] [address]    Surgery Packet mailed on 3/12/2021

## 2021-03-15 ENCOUNTER — TRANSFERRED RECORDS (OUTPATIENT)
Dept: HEALTH INFORMATION MANAGEMENT | Facility: CLINIC | Age: 66
End: 2021-03-15

## 2021-03-17 ENCOUNTER — TELEPHONE (OUTPATIENT)
Dept: SURGERY | Facility: CLINIC | Age: 66
End: 2021-03-17

## 2021-03-17 DIAGNOSIS — Z11.59 ENCOUNTER FOR SCREENING FOR OTHER VIRAL DISEASES: ICD-10-CM

## 2021-03-17 LAB
SARS-COV-2 RNA RESP QL NAA+PROBE: NORMAL
SPECIMEN SOURCE: NORMAL

## 2021-03-17 PROCEDURE — U0003 INFECTIOUS AGENT DETECTION BY NUCLEIC ACID (DNA OR RNA); SEVERE ACUTE RESPIRATORY SYNDROME CORONAVIRUS 2 (SARS-COV-2) (CORONAVIRUS DISEASE [COVID-19]), AMPLIFIED PROBE TECHNIQUE, MAKING USE OF HIGH THROUGHPUT TECHNOLOGIES AS DESCRIBED BY CMS-2020-01-R: HCPCS | Performed by: SURGERY

## 2021-03-17 PROCEDURE — U0005 INFEC AGEN DETEC AMPLI PROBE: HCPCS | Performed by: SURGERY

## 2021-03-17 NOTE — TELEPHONE ENCOUNTER
"In response to vm msg received today from patient, sent the following message to SOHAIL Ramirez:    \"Shiv Shipman msg received from patient stating that you tried to call him on his home number yesterday, but were unable to reach him. He requests that you call him back on his cell # [phone].    Thank-you,  Fariba\"  "

## 2021-03-18 ENCOUNTER — ANESTHESIA EVENT (OUTPATIENT)
Dept: SURGERY | Facility: AMBULATORY SURGERY CENTER | Age: 66
End: 2021-03-18

## 2021-03-18 LAB
LABORATORY COMMENT REPORT: NORMAL
SARS-COV-2 RNA RESP QL NAA+PROBE: NEGATIVE
SPECIMEN SOURCE: NORMAL

## 2021-03-19 ENCOUNTER — ANESTHESIA (OUTPATIENT)
Dept: SURGERY | Facility: AMBULATORY SURGERY CENTER | Age: 66
End: 2021-03-19

## 2021-03-19 ENCOUNTER — HOSPITAL ENCOUNTER (OUTPATIENT)
Facility: AMBULATORY SURGERY CENTER | Age: 66
Discharge: HOME | End: 2021-03-19
Attending: SURGERY | Admitting: SURGERY
Payer: COMMERCIAL

## 2021-03-19 VITALS
HEIGHT: 67 IN | TEMPERATURE: 97.3 F | RESPIRATION RATE: 16 BRPM | BODY MASS INDEX: 31.39 KG/M2 | DIASTOLIC BLOOD PRESSURE: 74 MMHG | OXYGEN SATURATION: 94 % | WEIGHT: 200 LBS | SYSTOLIC BLOOD PRESSURE: 118 MMHG | HEART RATE: 72 BPM

## 2021-03-19 DIAGNOSIS — K42.9 UMBILICAL HERNIA WITHOUT OBSTRUCTION AND WITHOUT GANGRENE: ICD-10-CM

## 2021-03-19 DIAGNOSIS — K40.20 BILATERAL INGUINAL HERNIA WITHOUT OBSTRUCTION OR GANGRENE, RECURRENCE NOT SPECIFIED: ICD-10-CM

## 2021-03-19 PROCEDURE — 49650 LAP ING HERNIA REPAIR INIT: CPT | Mod: 50

## 2021-03-19 DEVICE — MESH PROGRIP LAPAROSCOPIC 5.9X3.9" PARIETEX SELF-FIX LPG1510: Type: IMPLANTABLE DEVICE | Site: INGUINAL | Status: FUNCTIONAL

## 2021-03-19 RX ORDER — GLYCOPYRROLATE 0.2 MG/ML
INJECTION, SOLUTION INTRAMUSCULAR; INTRAVENOUS PRN
Status: DISCONTINUED | OUTPATIENT
Start: 2021-03-19 | End: 2021-03-19

## 2021-03-19 RX ORDER — SODIUM CHLORIDE, SODIUM LACTATE, POTASSIUM CHLORIDE, CALCIUM CHLORIDE 600; 310; 30; 20 MG/100ML; MG/100ML; MG/100ML; MG/100ML
INJECTION, SOLUTION INTRAVENOUS CONTINUOUS
Status: DISCONTINUED | OUTPATIENT
Start: 2021-03-19 | End: 2021-03-20 | Stop reason: HOSPADM

## 2021-03-19 RX ORDER — AMOXICILLIN 250 MG
1-2 CAPSULE ORAL 2 TIMES DAILY
Qty: 30 TABLET | Refills: 0 | Status: SHIPPED | OUTPATIENT
Start: 2021-03-19

## 2021-03-19 RX ORDER — OXYCODONE HYDROCHLORIDE 5 MG/1
5 TABLET ORAL EVERY 4 HOURS PRN
Status: DISCONTINUED | OUTPATIENT
Start: 2021-03-19 | End: 2021-03-20 | Stop reason: HOSPADM

## 2021-03-19 RX ORDER — GABAPENTIN 300 MG/1
300 CAPSULE ORAL ONCE
Status: COMPLETED | OUTPATIENT
Start: 2021-03-19 | End: 2021-03-19

## 2021-03-19 RX ORDER — NALOXONE HYDROCHLORIDE 0.4 MG/ML
0.2 INJECTION, SOLUTION INTRAMUSCULAR; INTRAVENOUS; SUBCUTANEOUS
Status: DISCONTINUED | OUTPATIENT
Start: 2021-03-19 | End: 2021-03-20 | Stop reason: HOSPADM

## 2021-03-19 RX ORDER — LIDOCAINE HYDROCHLORIDE 20 MG/ML
INJECTION, SOLUTION INFILTRATION; PERINEURAL PRN
Status: DISCONTINUED | OUTPATIENT
Start: 2021-03-19 | End: 2021-03-19

## 2021-03-19 RX ORDER — LIDOCAINE 40 MG/G
CREAM TOPICAL
Status: DISCONTINUED | OUTPATIENT
Start: 2021-03-19 | End: 2021-03-19 | Stop reason: HOSPADM

## 2021-03-19 RX ORDER — ACETAMINOPHEN 325 MG/1
975 TABLET ORAL ONCE
Status: COMPLETED | OUTPATIENT
Start: 2021-03-19 | End: 2021-03-19

## 2021-03-19 RX ORDER — CEFAZOLIN SODIUM 2 G/50ML
2 SOLUTION INTRAVENOUS
Status: DISCONTINUED | OUTPATIENT
Start: 2021-03-19 | End: 2021-03-19 | Stop reason: HOSPADM

## 2021-03-19 RX ORDER — SODIUM CHLORIDE, SODIUM LACTATE, POTASSIUM CHLORIDE, CALCIUM CHLORIDE 600; 310; 30; 20 MG/100ML; MG/100ML; MG/100ML; MG/100ML
INJECTION, SOLUTION INTRAVENOUS CONTINUOUS
Status: DISCONTINUED | OUTPATIENT
Start: 2021-03-19 | End: 2021-03-19 | Stop reason: HOSPADM

## 2021-03-19 RX ORDER — PROPOFOL 10 MG/ML
INJECTION, EMULSION INTRAVENOUS CONTINUOUS PRN
Status: DISCONTINUED | OUTPATIENT
Start: 2021-03-19 | End: 2021-03-19

## 2021-03-19 RX ORDER — CEFAZOLIN SODIUM 2 G/50ML
2 SOLUTION INTRAVENOUS SEE ADMIN INSTRUCTIONS
Status: DISCONTINUED | OUTPATIENT
Start: 2021-03-19 | End: 2021-03-19 | Stop reason: HOSPADM

## 2021-03-19 RX ORDER — ONDANSETRON 4 MG/1
4 TABLET, ORALLY DISINTEGRATING ORAL EVERY 30 MIN PRN
Status: DISCONTINUED | OUTPATIENT
Start: 2021-03-19 | End: 2021-03-20 | Stop reason: HOSPADM

## 2021-03-19 RX ORDER — DEXAMETHASONE SODIUM PHOSPHATE 4 MG/ML
INJECTION, SOLUTION INTRA-ARTICULAR; INTRALESIONAL; INTRAMUSCULAR; INTRAVENOUS; SOFT TISSUE PRN
Status: DISCONTINUED | OUTPATIENT
Start: 2021-03-19 | End: 2021-03-19

## 2021-03-19 RX ORDER — NALOXONE HYDROCHLORIDE 0.4 MG/ML
0.4 INJECTION, SOLUTION INTRAMUSCULAR; INTRAVENOUS; SUBCUTANEOUS
Status: DISCONTINUED | OUTPATIENT
Start: 2021-03-19 | End: 2021-03-20 | Stop reason: HOSPADM

## 2021-03-19 RX ORDER — FENTANYL CITRATE 50 UG/ML
INJECTION, SOLUTION INTRAMUSCULAR; INTRAVENOUS PRN
Status: DISCONTINUED | OUTPATIENT
Start: 2021-03-19 | End: 2021-03-19

## 2021-03-19 RX ORDER — HYDROCODONE BITARTRATE AND ACETAMINOPHEN 5; 325 MG/1; MG/1
1-2 TABLET ORAL EVERY 4 HOURS PRN
Qty: 15 TABLET | Refills: 0 | Status: SHIPPED | OUTPATIENT
Start: 2021-03-19

## 2021-03-19 RX ORDER — PROPOFOL 10 MG/ML
INJECTION, EMULSION INTRAVENOUS PRN
Status: DISCONTINUED | OUTPATIENT
Start: 2021-03-19 | End: 2021-03-19

## 2021-03-19 RX ORDER — KETOROLAC TROMETHAMINE 30 MG/ML
INJECTION, SOLUTION INTRAMUSCULAR; INTRAVENOUS PRN
Status: DISCONTINUED | OUTPATIENT
Start: 2021-03-19 | End: 2021-03-19

## 2021-03-19 RX ORDER — BUPIVACAINE HYDROCHLORIDE 5 MG/ML
INJECTION, SOLUTION PERINEURAL PRN
Status: DISCONTINUED | OUTPATIENT
Start: 2021-03-19 | End: 2021-03-19 | Stop reason: HOSPADM

## 2021-03-19 RX ORDER — MEPERIDINE HYDROCHLORIDE 25 MG/ML
12.5 INJECTION INTRAMUSCULAR; INTRAVENOUS; SUBCUTANEOUS
Status: DISCONTINUED | OUTPATIENT
Start: 2021-03-19 | End: 2021-03-20 | Stop reason: HOSPADM

## 2021-03-19 RX ORDER — ONDANSETRON 2 MG/ML
INJECTION INTRAMUSCULAR; INTRAVENOUS PRN
Status: DISCONTINUED | OUTPATIENT
Start: 2021-03-19 | End: 2021-03-19

## 2021-03-19 RX ORDER — FENTANYL CITRATE 50 UG/ML
25-50 INJECTION, SOLUTION INTRAMUSCULAR; INTRAVENOUS
Status: DISCONTINUED | OUTPATIENT
Start: 2021-03-19 | End: 2021-03-19 | Stop reason: HOSPADM

## 2021-03-19 RX ORDER — ONDANSETRON 2 MG/ML
4 INJECTION INTRAMUSCULAR; INTRAVENOUS EVERY 30 MIN PRN
Status: DISCONTINUED | OUTPATIENT
Start: 2021-03-19 | End: 2021-03-20 | Stop reason: HOSPADM

## 2021-03-19 RX ADMIN — GLYCOPYRROLATE 0.2 MG: 0.2 INJECTION, SOLUTION INTRAMUSCULAR; INTRAVENOUS at 13:27

## 2021-03-19 RX ADMIN — Medication 50 MG: at 12:54

## 2021-03-19 RX ADMIN — FENTANYL CITRATE 50 MCG: 50 INJECTION, SOLUTION INTRAMUSCULAR; INTRAVENOUS at 12:50

## 2021-03-19 RX ADMIN — CEFAZOLIN SODIUM 2 G: 2 SOLUTION INTRAVENOUS at 12:43

## 2021-03-19 RX ADMIN — KETOROLAC TROMETHAMINE 15 MG: 30 INJECTION, SOLUTION INTRAMUSCULAR; INTRAVENOUS at 14:47

## 2021-03-19 RX ADMIN — Medication 10 MG: at 13:50

## 2021-03-19 RX ADMIN — GABAPENTIN 300 MG: 300 CAPSULE ORAL at 11:20

## 2021-03-19 RX ADMIN — DEXAMETHASONE SODIUM PHOSPHATE 4 MG: 4 INJECTION, SOLUTION INTRA-ARTICULAR; INTRALESIONAL; INTRAMUSCULAR; INTRAVENOUS; SOFT TISSUE at 13:03

## 2021-03-19 RX ADMIN — SODIUM CHLORIDE, SODIUM LACTATE, POTASSIUM CHLORIDE, CALCIUM CHLORIDE: 600; 310; 30; 20 INJECTION, SOLUTION INTRAVENOUS at 11:21

## 2021-03-19 RX ADMIN — ONDANSETRON 4 MG: 2 INJECTION INTRAMUSCULAR; INTRAVENOUS at 13:31

## 2021-03-19 RX ADMIN — PROPOFOL 200 MG: 10 INJECTION, EMULSION INTRAVENOUS at 12:50

## 2021-03-19 RX ADMIN — PROPOFOL 50 MG: 10 INJECTION, EMULSION INTRAVENOUS at 12:53

## 2021-03-19 RX ADMIN — PROPOFOL 150 MCG/KG/MIN: 10 INJECTION, EMULSION INTRAVENOUS at 12:50

## 2021-03-19 RX ADMIN — ACETAMINOPHEN 975 MG: 325 TABLET ORAL at 11:20

## 2021-03-19 RX ADMIN — LIDOCAINE HYDROCHLORIDE 100 MG: 20 INJECTION, SOLUTION INFILTRATION; PERINEURAL at 12:50

## 2021-03-19 RX ADMIN — FENTANYL CITRATE 50 MCG: 50 INJECTION, SOLUTION INTRAMUSCULAR; INTRAVENOUS at 13:25

## 2021-03-19 ASSESSMENT — ENCOUNTER SYMPTOMS: DYSRHYTHMIAS: 1

## 2021-03-19 ASSESSMENT — MIFFLIN-ST. JEOR: SCORE: 1644.69

## 2021-03-19 NOTE — ANESTHESIA POSTPROCEDURE EVALUATION
Patient: Lito Jensen    Procedure(s):  HERNIORRHAPHY, INGUINAL, ROBOT-ASSISTED BILATERAL  HERNIORRHAPHY, UMBILICAL, OPEN    Diagnosis:Bilateral inguinal hernia without obstruction or gangrene, recurrence not specified [K40.20]  Umbilical hernia without obstruction and without gangrene [K42.9]  Diagnosis Additional Information: No value filed.    Anesthesia Type:  General    Note:  Disposition: Outpatient   Postop Pain Control: Uneventful            Sign Out: Well controlled pain   PONV: No   Neuro/Psych: Uneventful            Sign Out: Acceptable/Baseline neuro status   Airway/Respiratory: Uneventful            Sign Out: Acceptable/Baseline resp. status   CV/Hemodynamics: Uneventful            Sign Out: Acceptable CV status   Other NRE: NONE   DID A NON-ROUTINE EVENT OCCUR? No         Last vitals:  Vitals:    03/19/21 1520 03/19/21 1530 03/19/21 1536   BP: 119/76 115/77 124/76   Pulse: 70 66 68   Resp: 12 13 16   Temp:   36.3  C (97.3  F)   SpO2: 98% 98% 93%       Last vitals prior to Anesthesia Care Transfer:  CRNA VITALS  3/19/2021 1443 - 3/19/2021 1543      3/19/2021             EKG:  Sinus rhythm          Electronically Signed By: Davidson Santos DO  March 19, 2021  3:43 PM

## 2021-03-19 NOTE — ANESTHESIA CARE TRANSFER NOTE
Patient: Lito Jensen    Procedure(s):  HERNIORRHAPHY, INGUINAL, ROBOT-ASSISTED BILATERAL  HERNIORRHAPHY, UMBILICAL, OPEN    Diagnosis: Bilateral inguinal hernia without obstruction or gangrene, recurrence not specified [K40.20]  Umbilical hernia without obstruction and without gangrene [K42.9]  Diagnosis Additional Information: No value filed.    Anesthesia Type:   General     Note:    Oropharynx: oral airway in place and spontaneously breathing  Level of Consciousness: drowsy  Oxygen Supplementation: face mask  Level of Supplemental Oxygen (L/min / FiO2): 6  Independent Airway: airway patency satisfactory and stable  Dentition: dentition unchanged  Vital Signs Stable: post-procedure vital signs reviewed and stable  Report to RN Given: handoff report given  Patient transferred to: PACU    Handoff Report: Identifed the Patient, Identified the Reponsible Provider, Reviewed the pertinent medical history, Discussed the surgical course, Reviewed Intra-OP anesthesia mangement and issues during anesthesia, Set expectations for post-procedure period and Allowed opportunity for questions and acknowledgement of understanding      Vitals: (Last set prior to Anesthesia Care Transfer)  CRNA VITALS  3/19/2021 1438 - 3/19/2021 1508      3/19/2021             Pulse:  72    SpO2:  93 %        Electronically Signed By: TOAN Brock CRNA  March 19, 2021  3:08 PM

## 2021-03-19 NOTE — OP NOTE
Procedure Date: 03/19/2021      PREOPERATIVE DIAGNOSES:   1.  Bilateral inguinal hernia.   2.  Umbilical hernia.      POSTOPERATIVE DIAGNOSES:   1.  Bilateral inguinal hernia.   2.  Umbilical hernia.      OPERATIVE PROCEDURES:   1.  Laparoscopic bilateral inguinal hernioplasty, robot-assisted.   2.  Open mesh repair of umbilical hernia.      SURGEON:  Robbie Martins MD (Proctoring).      FIRST ASSISTANT:  Roland Nieto MD (Proctored surgeon).      ANESTHESIA:  General endotracheal.      INDICATIONS FOR PROCEDURE:  The patient presents with bilateral inguinal hernias, umbilical hernia.  Informed consent was obtained.      OPERATIVE FINDINGS:  Right inguinal indirect hernia with a large cord lipoma, left femoral hernia, umbilical hernia.  Refer to below.      DESCRIPTION OF PROCEDURE:  The patient was brought to the operating room, put under general anesthesia, abdomen widely prepped and draped in the usual sterile fashion. A supraumbilical skin incision was made.  Dissection was carried down to the umbilical hernia, was circumferentially cleared, tagged with 0 Vicryl out laterally and a Rosita port was then placed through the fascial defect here.  This was secured with the 0 Vicryl.  The abdomen was then insufflated and 8-ports were placed left and right lateral per routine technique and a Veress needle was placed suprapubic per routine and technique.  Once this was completed, the meshes were 15 x 10 cm Parietex ProGrip meshes.  These were placed in the abdominal cavity, as was the 3-0 Stratafix.  At this point, the robot was docked.  Attention turned to the console where the peritoneum at the right and the left was opened.  The right showed a fair-sized cord lipoma.  This was taken out of the indirect deep ring, thus showing the defect there.  The cord was peritonealized at the right.  Dissection was then carried out at the left where the preperitoneal space was entered, dissected down and a femoral hernia was  identified and reduced.  The meshes were then opened, unrolled to completely cover the left and the right myopectineal orifice with the mesh crossing at the midline and carried down to Octaviano ligament bilaterally.  The Veress needle was used as our localizing landmark.  The peritoneum was then closed with a running 3-0 Stratafix.  The Veress was then opened, thus decompressing the preperitoneal space, noting good placement of the mesh.  The needles were then removed under direct vision.  The abdomen was deflated.  Ports were removed.  The umbilical fascial defect was then closed after placing the underlay of the same Parietex ProGrip.  Approximately 6 x 4 cm patch was placed out laterally and secured out laterally using interrupted Vicryl stitches.  The fascial defect was then closed with a series figure-of-8 using the 0 Vicryl.  Umbilical skin was tacked down and the subcuticular stitch was used to close the skin incision.  Estimated blood loss was minimal.  The patient tolerated the procedure well and was taken to the recovery room where he was without difficulty or apparent complication.         ANDRES PERSAUD MD             D: 2021   T: 2021   MT: daron      Name:     ELINA ABRAHAM   MRN:      [id number]        Account:        [id number]   :      1955           Procedure Date: 2021      Document: [id number]

## 2021-03-19 NOTE — DISCHARGE INSTRUCTIONS
"[facility]  Home Care Following Anesthesia  For 24 hours after surgery:  1. Get plenty of rest.  A responsible adult must stay with you for at least 24 hours after you leave the surgery center.  2. Do not drive or use heavy equipment.  If you have weakness or tingling, don't drive or use heavy equipment until this feeling goes away.   3. Do not drink alcohol.   4. Avoid strenuous or risky activities.  Ask for help when climbing stairs.  5. You may feel lightheaded.  IF so, sit for a few minutes before standing.  Have someone help you get up.   6. If you have nausea (feel sick to your stomach): Drink only clear liquids such as apple juice, ginger ale, broth or 7-Up.  Rest may also help.  Be sure to drink enough fluids.  Move to a regular diet as you feel able.   7. You may have a slight fever.  Call the doctor if your fever is over 100 F (37.7 C) (taken under the tongue) or lasts longer than 24 hours.  8. You may have a dry mouth, a sore throat, muscle aches or trouble sleeping. These should go away after 24 hours.  9. Do not make important or legal decisions.        Today you received a Marcaine or bupivacaine block to numb the nerves near your surgery site.  This is a block using local anesthetic or \"numbing\" medication injected around the nerves to anesthetize or \"numb\" the area supplied by those nerves.  This block is injected into the muscle layer near your surgical site.  The medication may numb the location where you had surgery for 6-18 hours, but may last up to 24 hours.  If your surgical site is an arm or leg you should be careful with your affected limb, since it is possible to injure your limb without being aware of it due to the numbing.  Until full feeling returns, you should guard against bumping or hitting your limb, and avoid extreme hot or cold temperatures on the skin.  As the block wears off, the feeling will return as a tingling or prickly sensation near your " surgical site.  You will experience more discomfort from your incision as the feeling returns.  You may want to take a pain pill (a narcotic or Tylenol if this was prescribed by your surgeon) when you start to experience mild pain before the pain beccomes more severe.  If your pain medications do not control your pain you should notifiy your surgeon.    Tips for taking pain medications  To get the best pain relief possible, remember these points:    Take pain medications as directed, before pain becomes severe.    Pain medication can upset your stomach: taking it with food may help.    Constipation is a common side effect of pain medication. Drink plenty of  fluids.    Eat foods high in fiber. Take a stool softener if recommended by your doctor or pharmacist.    Do not drink alcohol, drive or operate machinery while taking pain medications.    Ask about other ways to control pain, such as with heat, ice or relaxation.    Tylenol/Acetaminophen Consumption: Tylenol 975 mg given at 1130 am.  Next available dose at 530 pm.  To help encourage the safe use of acetaminophen, the makers of TYLENOL  have lowered the maximum daily dose for single-ingredient Extra Strength TYLENOL  (acetaminophen) products sold in the [place] from 8 pills per day (4,000 mg) to 6 pills per day (3,000 mg). The dosing interval has also changed from 2 pills every 4-6 hours to 2 pills every 6 hours.    If you feel your pain relief is insufficient, you may take Tylenol/Acetaminophen in addition to your narcotic pain medication.     Be careful not to exceed 3,000 mg of Tylenol/Acetaminophen in a 24 hour period from all sources.    If you are taking extra strength Tylenol/acetaminophen (500 mg), the maximum dose is 6 tablets in 24 hours.    If you are taking regular strength acetaminophen (325 mg), the maximum dose is 9 tablets in 24 hours.    Call a doctor for any of the followin. Signs of infection (fever, growing tenderness at the surgery site, a  large amount of drainage or bleeding, severe pain, foul-smelling drainage, redness, swelling).  2. It has been over 8 to 10 hours since surgery and you are still not able to urinate (pass water).  3. Headache for over 24 hours.  4. Signs of Covid-19 infection (temperature over 100 degrees, shortness of breath, cough, loss of taste/smell, generalized body aches, persistent headache, chills, sore throat, nausea/vomiting/diarrhea)  Your doctor is:  Dr. Robbie Martins, General Surgery: [phone]  Or dial [phone] and ask for the resident on call for:  General Surgery  For emergency care, call the:  [place]:  [phone] (TTY for hearing impaired: [phone])

## 2021-03-19 NOTE — BRIEF OP NOTE
[facility] Brief Operative Note    Pre-operative diagnosis: Bilateral inguinal hernia without obstruction or gangrene, recurrence not specified [K40.20]  Umbilical hernia without obstruction and without gangrene [K42.9]   Post-operative diagnosis Same as Pre-op Dx   Procedure: Procedure(s):  HERNIORRHAPHY, INGUINAL, ROBOT-ASSISTED BILATERAL  HERNIORRHAPHY, UMBILICAL, OPEN   Surgeon: Robbie Martins MD   Assistants(s):    Estimated blood loss: 2 mL    Specimens: no   Findings: yes

## 2021-03-19 NOTE — ANESTHESIA PREPROCEDURE EVALUATION
Anesthesia Pre-Procedure Evaluation    Patient: Lito Jensen   MRN: [id number] : 1955        Preoperative Diagnosis: Bilateral inguinal hernia without obstruction or gangrene, recurrence not specified [K40.20]  Umbilical hernia without obstruction and without gangrene [K42.9]   Procedure : Procedure(s):  HERNIORRHAPHY, INGUINAL, ROBOT-ASSISTED BILATERAL  HERNIORRHAPHY, UMBILICAL, OPEN     Past Medical History:   Diagnosis Date     Arrhythmia       History reviewed. No pertinent surgical history.   No Known Allergies   Social History     Tobacco Use     Smoking status: Former Smoker     Quit date: 1972     Years since quittin.1     Smokeless tobacco: Never Used   Substance Use Topics     Alcohol use: Not on file      Wt Readings from Last 1 Encounters:   21 90.7 kg (200 lb)        Anesthesia Evaluation   Pt has had prior anesthetic.     No history of anesthetic complications       ROS/MED HX  ENT/Pulmonary:  - neg pulmonary ROS     Neurologic:  - neg neurologic ROS     Cardiovascular:     (+) Dyslipidemia -----dysrhythmias (Paroxysmal atrial tachycardia, controlled with flecainide BID, which was taken until yesterday morning),     METS/Exercise Tolerance: >4 METS    Hematologic:  - neg hematologic  ROS     Musculoskeletal:  - neg musculoskeletal ROS     GI/Hepatic:  - neg GI/hepatic ROS     Renal/Genitourinary:  - neg Renal ROS     Endo:  - neg endo ROS     Psychiatric/Substance Use:     (+) psychiatric history anxiety and depression     Infectious Disease:  - neg infectious disease ROS     Malignancy:   (+) Malignancy, History of Lymphoma/Leukemia.Lymph CA Remission status post.        Other:  - neg other ROS          Physical Exam    Airway  airway exam normal      Mallampati: III   TM distance: > 3 FB   Neck ROM: full   Mouth opening: > 3 cm    Respiratory Devices and Support         Dental       (+) implants    B=Bridge, C=Chipped, L=Loose, M=Missing    Cardiovascular    cardiovascular exam normal          Pulmonary   pulmonary exam normal                OUTSIDE LABS:  CBC:   Lab Results   Component Value Date    WBC 19.5 (H) 03/01/2021    WBC 18.0 (H) 09/27/2018    HGB 14.9 03/01/2021    HGB 15.1 09/27/2018    HCT 45.8 03/01/2021    HCT 44.4 09/27/2018     03/01/2021     09/27/2018     BMP:   Lab Results   Component Value Date     03/01/2021     09/27/2018    POTASSIUM 4.2 03/01/2021    POTASSIUM 4.0 09/27/2018    CHLORIDE 110 (H) 03/01/2021    CHLORIDE 106 09/27/2018    CO2 29 03/01/2021    CO2 26 09/27/2018    BUN 22 03/01/2021    BUN 17 09/27/2018    CR 1.05 03/01/2021    CR 1.14 09/27/2018    GLC 92 03/01/2021     (H) 09/27/2018     COAGS: No results found for: PTT, INR, FIBR  POC: No results found for: BGM, HCG, HCGS  HEPATIC:   Lab Results   Component Value Date    ALBUMIN 3.9 03/01/2021    PROTTOTAL 6.8 03/01/2021    ALT 30 03/01/2021    AST 18 03/01/2021    ALKPHOS 86 03/01/2021    BILITOTAL 0.5 03/01/2021     OTHER:   Lab Results   Component Value Date    MERCEDES 9.0 03/01/2021       Anesthesia Plan    ASA Status:  2   NPO Status:  NPO Appropriate    Anesthesia Type: General.     - Airway: ETT   Induction: Intravenous.   Maintenance: Balanced.        Consents    Anesthesia Plan(s) and associated risks, benefits, and realistic alternatives discussed. Questions answered and patient/representative(s) expressed understanding.     - Discussed with:  Patient      - Specific Concerns: PONV, sore throat, airway injury, cardiopulmonary complications.        Postoperative Care    Pain management: IV analgesics, Oral pain medications, Multi-modal analgesia.   PONV prophylaxis: Ondansetron (or other 5HT-3), Dexamethasone or Solumedrol     Comments:    EKG today shows NSR 60 bpm, no ectopy, no ischemic changes            Kyaw Beltran MD

## 2021-03-22 ENCOUNTER — PATIENT OUTREACH (OUTPATIENT)
Dept: SURGERY | Facility: CLINIC | Age: 66
End: 2021-03-22

## 2021-03-22 NOTE — PROGRESS NOTES
RN Post-Op/Post-Discharge Care Coordination Note    Mr. Lito Jensen is a 65 year old male who underwent robotic BIH and open umbilical hernia on 3/19 with  Dr. Robbie Martins.  Spoke with Patient.    Support  Patient able to care for self independently     Health Status  Fevers/chills: Patient denies any fever or chills.  Nausea/Vomiting: Patient denies nausea/vomiting.  Eating/drinking: Patient is able to eat and drink without any complaints.  Bowel habits: Patient reports constipation with last BM 3 days ago.  Patient is passing flatus and states that this is his norm.  He will take an OTC laxative if needed.  Drains (SHAUN): N/A  Incisions: Patient denies any signs and symptoms of infection..  Wound closure:  Steri-strips  Pain: Improving.  He has not taken any narcotics and is taking Tylenol PRN per package instructions.  He may apply local heat, protecting his skin from injury.  New Medications:  Norco,Senna    Activity/Restrictions  No lifting in excess of 15-20 pounds for 3-4 weeks    Equipment  None    Pathology reviewed with patient:  N/A    Forms/Letters  No    All of his questions were answered including reviewing restrictions and wound care.  He will call this office if he has any further questions and/or concerns.      In lieu of a post-op clinic patient that patient would like to be contacted in approximately 7-10 days via telephone.    A copy of this note was routed to the primary surgeon.      Whom and When to Call  Patient acknowledges understanding of how to manage any medication changes and   when to seek medical care.     Patient advised that if after hour medical concerns arise to please call [phone] and choose option 4 to speak to the physician on call.

## 2021-03-26 LAB — INTERPRETATION ECG - MUSE: NORMAL

## 2021-03-30 ENCOUNTER — NURSE TRIAGE (OUTPATIENT)
Dept: NURSING | Facility: CLINIC | Age: 66
End: 2021-03-30

## 2021-03-30 ENCOUNTER — PATIENT OUTREACH (OUTPATIENT)
Dept: SURGERY | Facility: CLINIC | Age: 66
End: 2021-03-30

## 2021-03-30 NOTE — TELEPHONE ENCOUNTER
On 3/19/2021 Pt had  HERNIORRHAPHY, INGUINAL, ROBOT-ASSISTED BILATERAL &   HERNIORRHAPHY, UMBILICAL, OPEN    Pt reporting today,    Very painful, tender, warm, burning, and inflamed.   No redness, fever or drainage noted.  It hurts a lot more when he sits up.     No other symptoms.         Pt would like a call back from the clinic.     Please call [phone] for further assistance.     Thank you     Peri Salgado RN  Central Triage Red Flags/Med Refills       Additional Information    Negative: Major abdominal surgical incision and wound gaping open with visible internal organs    Negative: Sounds like a life-threatening emergency to the triager    Negative: Bleeding from incision and won't stop after 10 minutes of direct pressure    Negative: Widespread rash and bright red, sunburn-like    Negative: Severe pain in the incision    Negative: Incision gaping open and < 2 days (48 hours) since wound re-opened    Negative: Incision gaping open and length of opening > 2 inches (5 cm)    Negative: Patient sounds very sick or weak to the triager    Negative: Sounds like a serious complication to the triager    Negative: Fever > 100.4 F (38.0 C)    Negative: Incision looks infected (spreading redness, pain)    Negative: Red streak runs from the incision and longer than 1 inch (2.5 cm)    Negative: Pus or bad-smelling fluid draining from incision    Negative: Dressing soaked with blood or body fluid (e.g., drainage)    Negative: Raised bruise and size > 2 inches (5 cm) and expanding    Patient wants to be seen    Negative: Clear or blood-tinged fluid draining from wound and no fever    Negative: Suture or staple removal is overdue    Negative: Incision gaping open and length of opening > 1/4 inch (6 mm) and on the face and over 2 days since wound re-opened    Negative: Incision gaping open and length of opening > 1/2 inch (1 cm) and over 2 days since wound re-opened    Negative: Caller has URGENT question and triager unable  to answer question    Protocols used: POST-OP INCISION SYMPTOMS AND [id number]

## 2021-03-30 NOTE — PROGRESS NOTES
Lito Jensen was contacted several days post procedure via telephone for a status update and elected to have a telephone follow -up call approximately 7-10 days after original contact in lieu of a clinic visit with Dr. Robbie Martins.  He continues to do well and the steri-strips are starting to peel off.  The patients wounds are healed and the area is C/D/I.  He is afebrile and angie po; the patient is slowly resuming his normal activity.   Discussed restrictions including no lifting in excess of 15-20 pounds for 3 weeks. He reports increased discomfort and swelling in the right groin which started over the weekend.  He was asked to apply local heat to the area, protecting his skin from injury. This should subside over the next several weeks.  Patient was given reassurance.    Pathology was reviewed with the patient: N/A    All of Lito Jensen question's were answered and  he would like to see Dr. Martins in the clinic on 4/1 at 12:30 PM.  If the pain subsides in the right groin, he will cancel the appointment.

## 2021-04-01 ENCOUNTER — OFFICE VISIT (OUTPATIENT)
Dept: SURGERY | Facility: CLINIC | Age: 66
End: 2021-04-01
Payer: COMMERCIAL

## 2021-04-01 VITALS
HEIGHT: 66 IN | WEIGHT: 197.1 LBS | DIASTOLIC BLOOD PRESSURE: 71 MMHG | BODY MASS INDEX: 31.68 KG/M2 | SYSTOLIC BLOOD PRESSURE: 116 MMHG | HEART RATE: 80 BPM | OXYGEN SATURATION: 97 %

## 2021-04-01 DIAGNOSIS — Z98.890 POSTOPERATIVE STATE: Primary | ICD-10-CM

## 2021-04-01 PROCEDURE — 99024 POSTOP FOLLOW-UP VISIT: CPT | Performed by: SURGERY

## 2021-04-01 ASSESSMENT — PAIN SCALES - GENERAL: PAINLEVEL: MODERATE PAIN (4)

## 2021-04-01 ASSESSMENT — MIFFLIN-ST. JEOR: SCORE: 1616.79

## 2021-04-01 NOTE — LETTER
4/1/2021       RE: Lito Jensen  [address]  [facility] [address]     Dear Colleague,    Thank you for referring your patient, Lito Jensen, to the [facility] [place] [facility]. Please see a copy of my visit note below.    Lito Jensen is status post:  3/19/2021  Davinci herniorrhaphy inguinal(Bilateral)Procedure: HERNIORRHAPHY, INGUINAL, ROBOT-ASSISTED BILATERAL;  Surgeon: Robbie Martins MD;  Location: [facility] OR  Surgery without complications.  Post-op course without complications, but continues with pain at the right inguinal region.  Incisions examined, no signs of infection. Inguinal canals without recurrence.  Post op pain suspect part of mesh scarification.   Reassured patient that should improve over next few months, will see me if that is not the case. Also with new onset of left calf and foot pain.  Today examined those sites and found no obvious etiology - referred to PCP for continued work up.  All of the patients questions were answered.  Standard post-op instructions and restrictions given.  Follow-up with me on a PRN basis.    Again, thank you for allowing me to participate in the care of your patient.      Sincerely,    Robbie Martins MD

## 2021-04-01 NOTE — PATIENT INSTRUCTIONS
You met with Dr. Robbie Martins.      Today's visit instructions:    Return to the Surgery Clinic on an as needed basis.        If you have questions please contact Ashley SAUCEDA during regular clinic hours, Monday through Friday 7:30 AM - 4:00 PM, or you can contact us via [facility] at anytime.       If you have urgent needs after-hours, weekends, or holidays please call the hospital at [phone] and ask to speak with our on-call General Surgery Team.    Appointment [phone], option #1   Nurse Advice (Ashley): [phone]   Surgery Scheduler (Fariba): [phone]  Fax: [phone]

## 2021-04-01 NOTE — PROGRESS NOTES
Lito Jensen is status post:  3/19/2021  Davinci herniorrhaphy inguinal(Bilateral)Procedure: HERNIORRHAPHY, INGUINAL, ROBOT-ASSISTED BILATERAL;  Surgeon: Robbie Martins MD;  Location: [facility] OR  Surgery without complications.  Post-op course without complications, but continues with pain at the right inguinal region.  Incisions examined, no signs of infection. Inguinal canals without recurrence.  Post op pain suspect part of mesh scarification.   Reassured patient that should improve over next few months, will see me if that is not the case. Also with new onset of left calf and foot pain.  Today examined those sites and found no obvious etiology - referred to PCP for continued work up.  All of the patients questions were answered.  Standard post-op instructions and restrictions given.  Follow-up with me on a PRN basis.

## 2021-04-01 NOTE — NURSING NOTE
"Chief Complaint   Patient presents with     Post-op Visit     Post op       Vitals:    04/01/21 1229   BP: 116/71   BP Location: Left arm   Patient Position: Sitting   Cuff Size: Adult Regular   Pulse: 80   SpO2: 97%   Weight: 197 lb 1.6 oz   Height: 5' 6\"       Body mass index is 31.81 kg/m .    Ryann Mckinnon MA    "

## 2021-05-22 ENCOUNTER — HEALTH MAINTENANCE LETTER (OUTPATIENT)
Age: 66
End: 2021-05-22

## 2021-09-11 ENCOUNTER — HEALTH MAINTENANCE LETTER (OUTPATIENT)
Age: 66
End: 2021-09-11

## 2022-06-18 ENCOUNTER — HEALTH MAINTENANCE LETTER (OUTPATIENT)
Age: 67
End: 2022-06-18

## 2022-10-30 ENCOUNTER — HEALTH MAINTENANCE LETTER (OUTPATIENT)
Age: 67
End: 2022-10-30

## 2023-06-25 ENCOUNTER — HEALTH MAINTENANCE LETTER (OUTPATIENT)
Age: 68
End: 2023-06-25

## 2023-09-07 ENCOUNTER — ONCOLOGY VISIT (OUTPATIENT)
Dept: ONCOLOGY | Facility: CLINIC | Age: 68
End: 2023-09-07
Attending: INTERNAL MEDICINE
Payer: COMMERCIAL

## 2023-09-07 VITALS
TEMPERATURE: 97.8 F | DIASTOLIC BLOOD PRESSURE: 74 MMHG | OXYGEN SATURATION: 96 % | BODY MASS INDEX: 31.97 KG/M2 | SYSTOLIC BLOOD PRESSURE: 120 MMHG | WEIGHT: 198.1 LBS | HEART RATE: 65 BPM

## 2023-09-07 DIAGNOSIS — C91.10 CLL (CHRONIC LYMPHOCYTIC LEUKEMIA) (H): Primary | ICD-10-CM

## 2023-09-07 PROCEDURE — 99213 OFFICE O/P EST LOW 20 MIN: CPT | Performed by: INTERNAL MEDICINE

## 2023-09-07 PROCEDURE — G0463 HOSPITAL OUTPT CLINIC VISIT: HCPCS | Performed by: INTERNAL MEDICINE

## 2023-09-07 RX ORDER — ROSUVASTATIN CALCIUM 20 MG/1
20 TABLET, COATED ORAL DAILY
COMMUNITY
Start: 2023-08-25 | End: 2024-08-24

## 2023-09-07 ASSESSMENT — PAIN SCALES - GENERAL: PAINLEVEL: NO PAIN (1)

## 2023-09-07 NOTE — PROGRESS NOTES
Hematology Clinic note  In person visit     PROBLEM LIST:   -CLL, stage 0 diagnosed 1995  -Hyperlipidemia  -Paroxysmal atrial tachycardia  -Colonic polyps, colonoscopy 2022  - Hyperlipidemia     Interim History:  Mr. Jensen is here for f/up of CLL. He was last seen in hematology/oncology clinic 3/11/21.  He has had no significant health issues since I last saw him.  He reports that he feels well. no complaints of any fevers, chills, sweats, early satiety, adenopathy.    He still works part-time for [employer], able to work completely remotely.  He is also been traveling a lot, and will spend his winter in [place].      PHYSICAL EXAMINATION:  /74 (BP Location: Right arm, Patient Position: Sitting, Cuff Size: Adult Regular)   Pulse 65   Temp 97.8  F (36.6  C) (Oral)   Wt 89.9 kg (198 lb 1.6 oz)   SpO2 96%   BMI 31.97 kg/m      General appearance:  Patient is 67 year old man in no acute distress.     HEENT:  No pallor, icterus, or mucositis.   Lymph nodes:  No cervical, supraclavicular, axillary, or inguinal lymphadenopathy.   Lungs:  Clear to auscultation bilaterally.   Heart:  Regular rate and rhythm; no S3 S4 or murmer.     Abdomen:  Positive bowel sounds, soft and nontender, nondistended.  No hepatomegaly. No splenomegaly appreciated.    Extremities:  No joint swelling or tenderness.  No ankle edema.     Skin:  No rash, no petechiae or ecchymoses.    Labs:  Care Everywhere 8/24/23  WBC 3.5 - 10.5 x10(9)/L 16.2 High     RBC 4.32 - 5.72 x10(12)/L 5.00    Hemoglobin 13.5 - 17.5 g/dL 14.9    HCT 38.8 - 50.0 % 43.8    MCV 80.0 - 100.0 fL 87.6    MCH 27.6 - 33.3 pg 29.8    MCHC 31.5 - 35.2 g/dL 34.0    RDW 11.9 - 15.5 % 13.3    Platelets 150 - 450 x10(9)/L 151    Automated NRBC <=0 /100 WBC 0    Resulting Agency  [facility]     Specimen Collected: 08/24/23 10:30 AM     RBC Morphology  Reviewed    Platelet Estimate Adequate Adequate    Neutrophil Absolute 1.7 - 7.0 10(9)/L 5.2    Lymphocyte  Absolute 1.0 - 4.8 10(9)/L 10.2 High     Monocytes Absolute 0.2 - 0.9 10(9)/L 0.8    Eosinophil Absolute 0.0 - 0.5 10(9)/L 0.0    Basophil Absolute 0.0 - 0.3 10(9)/L 0.0    [facility]     Specimen Collected: 08/24/23 10:30 AM       Assessment and recommendation:     # Stage 0 CLL/SLL diagnosed in 1995.  Interestingly his white count over the years went as high as 135,000, but then has gradually come back down.  He has never had any treatment.  His hemoglobin and platelet count are good.  He does not have any peripheral adenopathy and no symptoms to suggest that he needs any imaging, and at this time no indication for any treatment of CLL.       I recommend that he continue with the once a year CBC and clinic visit.  He prefers to make the appointment few months prior to the annual visit.    Time: I spent a total of 25 minutes on the day of the visit. Please see the note for further information on patient assessment and treatment.      Rebeca Webber MD  Hematology

## 2023-09-07 NOTE — NURSING NOTE
"Oncology Rooming Note    September 7, 2023 9:47 AM   Lito Jensen is a 67 year old male who presents for:    Chief Complaint   Patient presents with    Oncology Clinic Visit     Chronic lymphocytic leukemia      Initial Vitals: /74 (BP Location: Right arm, Patient Position: Sitting, Cuff Size: Adult Regular)   Pulse 65   SpO2 96%  Estimated body mass index is 31.81 kg/m  as calculated from the following:    Height as of 4/1/21: 1.676 m (5' 6\").    Weight as of 4/1/21: 89.4 kg (197 lb 1.6 oz). There is no height or weight on file to calculate BSA.  No Pain (1) Comment: Data Unavailable   No LMP for male patient.  Allergies reviewed: Yes  Medications reviewed: Yes    Medications: Medication refills not needed today.  Pharmacy name entered into EPIC:    [facility] - [place], [place] - [address] [id number] [place] [facility] [place], [place] - [address] [facility] [place], [place] - [id number]    Clinical concerns: none        Thelma Haji              "

## 2023-09-07 NOTE — LETTER
9/7/2023         RE: Lito Jensen  [address] [address]        Dear Colleague,    Thank you for referring your patient, Lito Jensen, to the [facility]. Please see a copy of my visit note below.    Hematology Clinic note  In person visit     PROBLEM LIST:   -CLL, stage 0 diagnosed 1995  -Hyperlipidemia  -Paroxysmal atrial tachycardia  -Colonic polyps, colonoscopy 2022  - Hyperlipidemia     Interim History:  Mr. Jensen is here for f/up of CLL. He was last seen in hematology/oncology clinic 3/11/21.  He has had no significant health issues since I last saw him.  He reports that he feels well. no complaints of any fevers, chills, sweats, early satiety, adenopathy.    He still works part-time for [employer], able to work completely remotely.  He is also been traveling a lot, and will spend his winter in [place].      PHYSICAL EXAMINATION:  /74 (BP Location: Right arm, Patient Position: Sitting, Cuff Size: Adult Regular)   Pulse 65   Temp 97.8  F (36.6  C) (Oral)   Wt 89.9 kg (198 lb 1.6 oz)   SpO2 96%   BMI 31.97 kg/m      General appearance:  Patient is 67 year old man in no acute distress.     HEENT:  No pallor, icterus, or mucositis.   Lymph nodes:  No cervical, supraclavicular, axillary, or inguinal lymphadenopathy.   Lungs:  Clear to auscultation bilaterally.   Heart:  Regular rate and rhythm; no S3 S4 or murmer.     Abdomen:  Positive bowel sounds, soft and nontender, nondistended.  No hepatomegaly. No splenomegaly appreciated.    Extremities:  No joint swelling or tenderness.  No ankle edema.     Skin:  No rash, no petechiae or ecchymoses.    Labs:  Care Everywhere 8/24/23  WBC 3.5 - 10.5 x10(9)/L 16.2 High     RBC 4.32 - 5.72 x10(12)/L 5.00    Hemoglobin 13.5 - 17.5 g/dL 14.9    HCT 38.8 - 50.0 % 43.8    MCV 80.0 - 100.0 fL 87.6    MCH 27.6 - 33.3 pg 29.8    MCHC 31.5 - 35.2 g/dL 34.0    RDW 11.9 - 15.5 % 13.3    Platelets  150 - 450 x10(9)/L 151    Automated NRBC <=0 /100 WBC 0    Resulting [facility]     Specimen Collected: 08/24/23 10:30 AM     RBC Morphology  Reviewed    Platelet Estimate Adequate Adequate    Neutrophil Absolute 1.7 - 7.0 10(9)/L 5.2    Lymphocyte Absolute 1.0 - 4.8 10(9)/L 10.2 High     Monocytes Absolute 0.2 - 0.9 10(9)/L 0.8    Eosinophil Absolute 0.0 - 0.5 10(9)/L 0.0    Basophil Absolute 0.0 - 0.3 10(9)/L 0.0    Resulting [facility]     Specimen Collected: 08/24/23 10:30 AM       Assessment and recommendation:     # Stage 0 CLL/SLL diagnosed in 1995.  Interestingly his white count over the years went as high as 135,000, but then has gradually come back down.  He has never had any treatment.  His hemoglobin and platelet count are good.  He does not have any peripheral adenopathy and no symptoms to suggest that he needs any imaging, and at this time no indication for any treatment of CLL.       I recommend that he continue with the once a year CBC and clinic visit.  He prefers to make the appointment few months prior to the annual visit.    Time: I spent a total of 25 minutes on the day of the visit. Please see the note for further information on patient assessment and treatment.      Rebeca Webber MD  Hematology

## 2024-01-19 ENCOUNTER — PATIENT OUTREACH (OUTPATIENT)
Dept: ONCOLOGY | Facility: CLINIC | Age: 69
End: 2024-01-19
Payer: COMMERCIAL

## 2024-01-19 NOTE — PROGRESS NOTES
[facility]: Cancer Care                                                                                          Completed chart audit to update Oncology Care Coordination enrollment status.  Reviewed POC and pt has appropriate follow up scheduled.       Signature:  Yamilet Hayward RN

## 2024-08-18 ENCOUNTER — HEALTH MAINTENANCE LETTER (OUTPATIENT)
Age: 69
End: 2024-08-18

## 2024-08-29 ENCOUNTER — PATIENT OUTREACH (OUTPATIENT)
Dept: ONCOLOGY | Facility: CLINIC | Age: 69
End: 2024-08-29
Payer: COMMERCIAL

## 2025-06-05 DIAGNOSIS — C91.10 CHRONIC LYMPHOCYTIC LEUKEMIA (H): Primary | ICD-10-CM

## 2025-07-03 ENCOUNTER — APPOINTMENT (OUTPATIENT)
Dept: LAB | Facility: CLINIC | Age: 70
End: 2025-07-03
Attending: INTERNAL MEDICINE
Payer: COMMERCIAL

## 2025-07-03 ENCOUNTER — ONCOLOGY VISIT (OUTPATIENT)
Dept: ONCOLOGY | Facility: CLINIC | Age: 70
End: 2025-07-03
Attending: INTERNAL MEDICINE
Payer: COMMERCIAL

## 2025-07-03 VITALS
DIASTOLIC BLOOD PRESSURE: 70 MMHG | TEMPERATURE: 98.1 F | OXYGEN SATURATION: 95 % | SYSTOLIC BLOOD PRESSURE: 116 MMHG | HEART RATE: 78 BPM

## 2025-07-03 DIAGNOSIS — R53.83 FATIGUE, UNSPECIFIED TYPE: Primary | ICD-10-CM

## 2025-07-03 DIAGNOSIS — C91.10 CHRONIC LYMPHOCYTIC LEUKEMIA (H): ICD-10-CM

## 2025-07-03 LAB
ALBUMIN SERPL BCG-MCNC: 4.3 G/DL (ref 3.5–5.2)
ALP SERPL-CCNC: 92 U/L (ref 40–150)
ALT SERPL W P-5'-P-CCNC: 20 U/L (ref 0–70)
ANION GAP SERPL CALCULATED.3IONS-SCNC: 11 MMOL/L (ref 7–15)
AST SERPL W P-5'-P-CCNC: 22 U/L (ref 0–45)
BASOPHILS # BLD MANUAL: 0 10E3/UL (ref 0–0.2)
BASOPHILS NFR BLD MANUAL: 0 %
BILIRUB SERPL-MCNC: 0.4 MG/DL
BUN SERPL-MCNC: 24.6 MG/DL (ref 8–23)
CALCIUM SERPL-MCNC: 9.2 MG/DL (ref 8.8–10.4)
CHLORIDE SERPL-SCNC: 107 MMOL/L (ref 98–107)
CREAT SERPL-MCNC: 1.05 MG/DL (ref 0.67–1.17)
EGFRCR SERPLBLD CKD-EPI 2021: 76 ML/MIN/1.73M2
EOSINOPHIL # BLD MANUAL: 0 10E3/UL (ref 0–0.7)
EOSINOPHIL NFR BLD MANUAL: 0 %
ERYTHROCYTE [DISTWIDTH] IN BLOOD BY AUTOMATED COUNT: 13.2 % (ref 10–15)
GLUCOSE SERPL-MCNC: 100 MG/DL (ref 70–99)
HCO3 SERPL-SCNC: 22 MMOL/L (ref 22–29)
HCT VFR BLD AUTO: 43.8 % (ref 40–53)
HGB BLD-MCNC: 14.6 G/DL (ref 13.3–17.7)
LYMPHOCYTES # BLD MANUAL: 7.3 10E3/UL (ref 0.8–5.3)
LYMPHOCYTES NFR BLD MANUAL: 48 %
MCH RBC QN AUTO: 29.1 PG (ref 26.5–33)
MCHC RBC AUTO-ENTMCNC: 33.3 G/DL (ref 31.5–36.5)
MCV RBC AUTO: 87 FL (ref 78–100)
MONOCYTES # BLD MANUAL: 1.4 10E3/UL (ref 0–1.3)
MONOCYTES NFR BLD MANUAL: 9 %
NEUTROPHILS # BLD MANUAL: 6.5 10E3/UL (ref 1.6–8.3)
NEUTROPHILS NFR BLD MANUAL: 43 %
PLAT MORPH BLD: NORMAL
PLATELET # BLD AUTO: 140 10E3/UL (ref 150–450)
POTASSIUM SERPL-SCNC: 4.2 MMOL/L (ref 3.4–5.3)
PROT SERPL-MCNC: 6.4 G/DL (ref 6.4–8.3)
RBC # BLD AUTO: 5.01 10E6/UL (ref 4.4–5.9)
RBC MORPH BLD: NORMAL
SODIUM SERPL-SCNC: 140 MMOL/L (ref 135–145)
TSH SERPL DL<=0.005 MIU/L-ACNC: 3.37 UIU/ML (ref 0.3–4.2)
WBC # BLD AUTO: 15.2 10E3/UL (ref 4–11)

## 2025-07-03 PROCEDURE — 82947 ASSAY GLUCOSE BLOOD QUANT: CPT | Performed by: INTERNAL MEDICINE

## 2025-07-03 PROCEDURE — 99213 OFFICE O/P EST LOW 20 MIN: CPT | Performed by: INTERNAL MEDICINE

## 2025-07-03 PROCEDURE — 85007 BL SMEAR W/DIFF WBC COUNT: CPT | Performed by: INTERNAL MEDICINE

## 2025-07-03 PROCEDURE — 85041 AUTOMATED RBC COUNT: CPT | Performed by: INTERNAL MEDICINE

## 2025-07-03 PROCEDURE — 84443 ASSAY THYROID STIM HORMONE: CPT | Performed by: INTERNAL MEDICINE

## 2025-07-03 PROCEDURE — 36415 COLL VENOUS BLD VENIPUNCTURE: CPT | Performed by: INTERNAL MEDICINE

## 2025-07-03 RX ORDER — PREDNISONE 20 MG/1
2 TABLET ORAL
COMMUNITY
Start: 2024-09-19

## 2025-07-03 RX ORDER — AZELASTINE 1 MG/ML
1 SPRAY, METERED NASAL 2 TIMES DAILY
COMMUNITY

## 2025-07-03 RX ORDER — OLOPATADINE HYDROCHLORIDE 1 MG/ML
1 SOLUTION OPHTHALMIC 2 TIMES DAILY
COMMUNITY
Start: 2025-06-23

## 2025-07-03 NOTE — LETTER
7/3/2025      Lito Jensen  [address] [address]      Dear Colleague,    Thank you for referring your patient, Lito Jensen, to the [facility] CANCER CLINIC. Please see a copy of my visit note below.    Hematology Clinic note  In person visit     PROBLEM LIST:   -CLL, stage 0 diagnosed 1995  -Hyperlipidemia  -Paroxysmal atrial tachycardia  -Colonic polyps, colonoscopy 2022  - Hyperlipidemia  -Obesity, BMI 31  -Status post cataract surgery  -History of COVID infection, pneumonia 2023- not hospitalized     Interim History:  Mr. Jensen ( pronounced Gold-armando; long i ) is here for f/up of CLL. He was last seen in hematology/oncology clinic 9/7/23.    No significant health issues over the past 2 years.  He says he is feeling well, but gets tired a little more easily.  For example when he mows the lawn, he needs to take a break.  However he is able to do gardening also.  In the winter he lives in [place] and walks about 4 miles a day.  He does not do any sort of strength exercises.  He has not noticed any fevers, chills, sweats, adenopathy.    He notes that within the past year his statin was switched to rosuvastatin.      PHYSICAL EXAMINATION:  /70   Pulse 78   Temp 98.1  F (36.7  C) (Oral)   SpO2 95%     General appearance:  Patient is 69 year old man in no acute distress.  He is able to climb onto exam table without difficulty.  HEENT:  No pallor, icterus, or mucositis.   Lymph nodes:  No cervical, supraclavicular, axillary, or inguinal lymphadenopathy.   Lungs:  Clear to auscultation bilaterally.   Heart:  Regular rate and rhythm; no S3 S4 or murmer.     Abdomen:  Positive bowel sounds, soft and nontender, nondistended.  Obese.  No hepatomegaly. No splenomegaly appreciated.    Extremities:  No joint swelling or tenderness.  No ankle edema.     Skin:  No rash, no petechiae or ecchymoses.    Labs:     Latest Reference Range & Units 02/01/17 15:56  01/31/18 15:32 09/27/18 15:26 03/01/21 13:59 07/03/25 12:18   WBC 4.0 - 11.0 10e3/uL 26.9 (H) 22.4 (H) 18.0 (H) 19.5 (H) 15.2 (H)   Hemoglobin 13.3 - 17.7 g/dL 15.7 15.7 15.1 14.9 14.6   Hematocrit 40.0 - 53.0 % 46.2 46.4 44.4 45.8 43.8   Platelet Count 150 - 450 10e3/uL 166 163 153 171 140 (L)   RBC Count 4.40 - 5.90 10e6/uL 5.34 5.33 5.03 5.03 5.01   MCV 78 - 100 fL 87 87 88 91 87   MCH 26.5 - 33.0 pg 29.4 29.5 30.0 29.6 29.1   MCHC 31.5 - 36.5 g/dL 34.0 33.8 34.0 32.5 33.3   RDW 10.0 - 15.0 % 12.9 12.8 13.3 12.8 13.2   Diff Method  Manual Differential Manual Differential Automated Method Manual Differential    % Neutrophils % 19.0 37.0 30.6 40.0 43   % Lymphocytes % 76.0 57.0 64.2 55.0 48   % Monocytes % 3.0 3.0 3.8 2.0 9   % Eosinophils % 1.0 3.0 0.8 2.0 0   % Basophils % 1.0 0.0 0.4 1.0 0   % Immature Granulocytes %   0.2     Nucleated RBCs 0 /100   1 (H)     Absolute Neutrophil 1.6 - 8.3 10e3/uL 5.1 8.3 5.5 7.8 6.5   Absolute Lymphocytes 0.8 - 5.3 10e3/uL 20.4 (H) 12.8 (H) 11.6 (H) 10.7 (H) 7.3 (H)   Absolute Monocytes 0.0 - 1.3 10e3/uL 0.8 0.7 0.7 0.4 1.4 (H)   Absolute Eosinophils 0.0 - 0.7 10e3/uL 0.3 0.7 0.2 0.4 0.0   Absolute Basophils 0.0 - 0.2 10e3/uL 0.3 (H) 0.0 0.1 0.2 0.0      [facility] Reference Range & Units 07/03/25 12:18   Sodium 135 - 145 mmol/L 140   Potassium 3.4 - 5.3 mmol/L 4.2   Chloride 98 - 107 mmol/L 107   Carbon Dioxide (CO2) 22 - 29 mmol/L 22   Urea Nitrogen 8.0 - 23.0 mg/dL 24.6 (H)   Creatinine 0.67 - 1.17 mg/dL 1.05   GFR Estimate >60 mL/min/1.73m2 76   Calcium 8.8 - 10.4 mg/dL 9.2   Anion Gap 7 - 15 mmol/L 11   Albumin 3.5 - 5.2 g/dL 4.3   Protein Total 6.4 - 8.3 g/dL 6.4   Alkaline Phosphatase 40 - 150 U/L 92   ALT 0 - 70 U/L 20   AST 0 - 45 U/L 22   Bilirubin Total <=1.2 mg/dL 0.4   Glucose 70 - 99 mg/dL 100 (H)   (H): Data is abnormally high    Assessment and recommendation:      # Stage 0 CLL/SLL diagnosed in 1995.  Interestingly his white count and lymphocyte count have gradually  dropped over the years..  He has never had any treatment.  His hemoglobin is normal, mildly low platelets.  He does not have any peripheral adenopathy and no symptoms to suggest that he needs any imaging, and at this time no indication for any treatment of CLL.      # low platelets-this is very mild.  I have added a TSH to his labs, because he has had some fatigue and hypothyroidism can affect the platelets.  She it might be from the rosuvastatin, which can cause thrombocytopenia in rare cases periods and will not do any additional evaluation at this time.  However on recheck his platelet count is dropping lower, then he will need further evaluation,     Labs at end of October through primary care-CBC with differential, B12    RTC 1 year.     Time: I spent a total of 35 minutes on the day of the visit. Please see the note for further information on patient assessment and treatment.      Rebeca Webber MD  Hematology    Again, thank you for allowing me to participate in the care of your patient.        Sincerely,        Rebeca Webber MD    Electronically signed

## 2025-07-03 NOTE — PROGRESS NOTES
Hematology Clinic note  In person visit     PROBLEM LIST:   -CLL, stage 0 diagnosed 1995  -Hyperlipidemia  -Paroxysmal atrial tachycardia  -Colonic polyps, colonoscopy 2022  - Hyperlipidemia  -Obesity, BMI 31  -Status post cataract surgery  -History of COVID infection, pneumonia 2023- not hospitalized     Interim History:  Mr. Jensen ( pronounced Gold-armando; long i ) is here for f/up of CLL. He was last seen in hematology/oncology clinic 9/7/23.    No significant health issues over the past 2 years.  He says he is feeling well, but gets tired a little more easily.  For example when he mows the lawn, he needs to take a break.  However he is able to do gardening also.  In the winter he lives in [place] and walks about 4 miles a day.  He does not do any sort of strength exercises.  He has not noticed any fevers, chills, sweats, adenopathy.    He notes that within the past year his statin was switched to rosuvastatin.      PHYSICAL EXAMINATION:  /70   Pulse 78   Temp 98.1  F (36.7  C) (Oral)   SpO2 95%     General appearance:  Patient is 69 year old man in no acute distress.  He is able to climb onto exam table without difficulty.  HEENT:  No pallor, icterus, or mucositis.   Lymph nodes:  No cervical, supraclavicular, axillary, or inguinal lymphadenopathy.   Lungs:  Clear to auscultation bilaterally.   Heart:  Regular rate and rhythm; no S3 S4 or murmer.     Abdomen:  Positive bowel sounds, soft and nontender, nondistended.  Obese.  No hepatomegaly. No splenomegaly appreciated.    Extremities:  No joint swelling or tenderness.  No ankle edema.     Skin:  No rash, no petechiae or ecchymoses.    Labs:     Latest Reference Range & Units 02/01/17 15:56 01/31/18 15:32 09/27/18 15:26 03/01/21 13:59 07/03/25 12:18   WBC 4.0 - 11.0 10e3/uL 26.9 (H) 22.4 (H) 18.0 (H) 19.5 (H) 15.2 (H)   Hemoglobin 13.3 - 17.7 g/dL 15.7 15.7 15.1 14.9 14.6   Hematocrit 40.0 - 53.0 % 46.2 46.4 44.4 45.8 43.8   Platelet Count 150 - 450  10e3/uL 166 163 153 171 140 (L)   RBC Count 4.40 - 5.90 10e6/uL 5.34 5.33 5.03 5.03 5.01   MCV 78 - 100 fL 87 87 88 91 87   MCH 26.5 - 33.0 pg 29.4 29.5 30.0 29.6 29.1   MCHC 31.5 - 36.5 g/dL 34.0 33.8 34.0 32.5 33.3   RDW 10.0 - 15.0 % 12.9 12.8 13.3 12.8 13.2   Diff Method  Manual Differential Manual Differential Automated Method Manual Differential    % Neutrophils % 19.0 37.0 30.6 40.0 43   % Lymphocytes % 76.0 57.0 64.2 55.0 48   % Monocytes % 3.0 3.0 3.8 2.0 9   % Eosinophils % 1.0 3.0 0.8 2.0 0   % Basophils % 1.0 0.0 0.4 1.0 0   % Immature Granulocytes %   0.2     Nucleated RBCs 0 /100   1 (H)     Absolute Neutrophil 1.6 - 8.3 10e3/uL 5.1 8.3 5.5 7.8 6.5   Absolute Lymphocytes 0.8 - 5.3 10e3/uL 20.4 (H) 12.8 (H) 11.6 (H) 10.7 (H) 7.3 (H)   Absolute Monocytes 0.0 - 1.3 10e3/uL 0.8 0.7 0.7 0.4 1.4 (H)   Absolute Eosinophils 0.0 - 0.7 10e3/uL 0.3 0.7 0.2 0.4 0.0   Absolute Basophils 0.0 - 0.2 10e3/uL 0.3 (H) 0.0 0.1 0.2 0.0      [facility] Reference Range & Units 07/03/25 12:18   Sodium 135 - 145 mmol/L 140   Potassium 3.4 - 5.3 mmol/L 4.2   Chloride 98 - 107 mmol/L 107   Carbon Dioxide (CO2) 22 - 29 mmol/L 22   Urea Nitrogen 8.0 - 23.0 mg/dL 24.6 (H)   Creatinine 0.67 - 1.17 mg/dL 1.05   GFR Estimate >60 mL/min/1.73m2 76   Calcium 8.8 - 10.4 mg/dL 9.2   Anion Gap 7 - 15 mmol/L 11   Albumin 3.5 - 5.2 g/dL 4.3   Protein Total 6.4 - 8.3 g/dL 6.4   Alkaline Phosphatase 40 - 150 U/L 92   ALT 0 - 70 U/L 20   AST 0 - 45 U/L 22   Bilirubin Total <=1.2 mg/dL 0.4   Glucose 70 - 99 mg/dL 100 (H)   (H): Data is abnormally high    Assessment and recommendation:      # Stage 0 CLL/SLL diagnosed in 1995.  Interestingly his white count and lymphocyte count have gradually dropped over the years..  He has never had any treatment.  His hemoglobin is normal, mildly low platelets.  He does not have any peripheral adenopathy and no symptoms to suggest that he needs any imaging, and at this time no indication for any treatment of CLL.       # low platelets-this is very mild.  I have added a TSH to his labs, because he has had some fatigue and hypothyroidism can affect the platelets.  She it might be from the rosuvastatin, which can cause thrombocytopenia in rare cases periods and will not do any additional evaluation at this time.  However on recheck his platelet count is dropping lower, then he will need further evaluation,     Labs at end of October through primary care-CBC with differential, B12    RTC 1 year.     Time: I spent a total of 35 minutes on the day of the visit. Please see the note for further information on patient assessment and treatment.      Rebeca Webber MD  Hematology

## 2025-07-03 NOTE — NURSING NOTE
"Oncology Rooming Note    July 3, 2025 12:38 PM   Lito Jensen is a 69 year old male who presents for:    Chief Complaint   Patient presents with    Blood Draw     Labs drawn via  by RN. VS taken.    Oncology Clinic Visit     Chronic Lymphocytic Leukemia     Initial Vitals: /70   Pulse 78   Temp 98.1  F (36.7  C) (Oral)   SpO2 95%  Estimated body mass index is 31.97 kg/m  as calculated from the following:    Height as of 4/1/21: 1.676 m (5' 6\").    Weight as of 9/7/23: 89.9 kg (198 lb 1.6 oz). There is no height or weight on file to calculate BSA.  Data Unavailable Comment: Data Unavailable   No LMP for male patient.  Allergies reviewed: Yes  Medications reviewed: Yes    Medications: Medication refills not needed today.  Pharmacy name entered into [email or web address]: [facility] [id number] [place] [address]    Frailty Screening:   Is the patient here for a new oncology consult visit in cancer care? 2. No    PHQ9:  Did this patient require a PHQ9?: Yes; pt was on his phone.      Clinical concerns: None       Lucy Flores LPN  7/3/2025                "

## 2025-07-14 ENCOUNTER — PATIENT OUTREACH (OUTPATIENT)
Dept: ONCOLOGY | Facility: CLINIC | Age: 70
End: 2025-07-14
Payer: COMMERCIAL

## 2025-07-14 NOTE — PROGRESS NOTES
[facility]: Cancer Care                                                                                          Completed chart audit to update Oncology Care Coordination enrollment status.  Reviewed POC and pt needs to RTC in one year.  IB sent to scheduling.       Signature:  Yamilet Hayward RN

## (undated) DEVICE — SUCTION MANIFOLD NEPTUNE 2 SYS 4 PORT 0702-020-000

## (undated) DEVICE — DRAPE LAP W/ARMBOARD 29410

## (undated) DEVICE — DAVINCI XI DRAPE ARM 470015

## (undated) DEVICE — DAVINCI HOT SHEARS TIP COVER  400180

## (undated) DEVICE — ESU PENCIL W/COATED BLADE E2450H

## (undated) DEVICE — SYR 30ML SLIP TIP W/O NDL 302833

## (undated) DEVICE — DRSG GAUZE 4X4" 3033

## (undated) DEVICE — NDL INSUFFLATION 13GA 120MM C2201

## (undated) DEVICE — BLADE CLIPPER SGL USE 9680

## (undated) DEVICE — SU PDS II 0 CT-2 27" Z334H

## (undated) DEVICE — SU MONOCRYL 4-0 PS-2 18" UND Y496G

## (undated) DEVICE — DRSG STERI STRIP 1/2X4" R1547

## (undated) DEVICE — GLOVE PROTEXIS W/NEU-THERA 7.5  2D73TE75

## (undated) DEVICE — DAVINCI XI DRAPE COLUMN 470341

## (undated) DEVICE — SU PDS II 2-0 SH 27" Z317H

## (undated) DEVICE — DAVINCI XI GRASPER ENDOWRIST PROGRASP 470093

## (undated) DEVICE — GLOVE PROTEXIS POWDER FREE SMT 7.5  2D72PT75X

## (undated) DEVICE — PREP CHLORAPREP 26ML TINTED ORANGE  260815

## (undated) DEVICE — DAVINCI XI MONOPOLAR SCISSORS HOT SHEARS 8MM 470179

## (undated) DEVICE — PACK LAP CHOLE CUSTOM ASC

## (undated) DEVICE — SOL WATER IRRIG 500ML BOTTLE 2F7113

## (undated) DEVICE — SU VICRYL 0 CT-2 27" J334H

## (undated) DEVICE — DAVINCI XI NDL DRIVER LARGE 470006

## (undated) DEVICE — SU MONOCRYL 4-0 PS-2 27" UND Y426H

## (undated) DEVICE — LINEN TOWEL PACK X5 5464

## (undated) DEVICE — PACK MINOR CUSTOM ASC

## (undated) DEVICE — DRAPE MAYO STAND 23X54 8337

## (undated) DEVICE — SOL NACL 0.9% IRRIG 500ML BOTTLE 2F7123

## (undated) DEVICE — ESU GROUND PAD ADULT W/CORD E7507

## (undated) DEVICE — SU STRATAFIX PDS PLUS 3-0 SPIRAL SH 15CM SXPP1B420

## (undated) DEVICE — GOWN XLG DISP 9545

## (undated) DEVICE — LINEN GOWN XLG 5407

## (undated) DEVICE — DAVINCI XI SEAL UNIVERSAL 5-8MM 470361

## (undated) RX ORDER — PROPOFOL 10 MG/ML
INJECTION, EMULSION INTRAVENOUS
Status: DISPENSED
Start: 2021-03-19

## (undated) RX ORDER — FENTANYL CITRATE 50 UG/ML
INJECTION, SOLUTION INTRAMUSCULAR; INTRAVENOUS
Status: DISPENSED
Start: 2021-03-19

## (undated) RX ORDER — DEXAMETHASONE SODIUM PHOSPHATE 4 MG/ML
INJECTION, SOLUTION INTRA-ARTICULAR; INTRALESIONAL; INTRAMUSCULAR; INTRAVENOUS; SOFT TISSUE
Status: DISPENSED
Start: 2021-03-19

## (undated) RX ORDER — CEFAZOLIN SODIUM 2 G/50ML
SOLUTION INTRAVENOUS
Status: DISPENSED
Start: 2021-03-19

## (undated) RX ORDER — CEFAZOLIN SODIUM 1 G/3ML
INJECTION, POWDER, FOR SOLUTION INTRAMUSCULAR; INTRAVENOUS
Status: DISPENSED
Start: 2021-03-19

## (undated) RX ORDER — ONDANSETRON 2 MG/ML
INJECTION INTRAMUSCULAR; INTRAVENOUS
Status: DISPENSED
Start: 2021-03-19

## (undated) RX ORDER — GABAPENTIN 300 MG/1
CAPSULE ORAL
Status: DISPENSED
Start: 2021-03-19

## (undated) RX ORDER — KETOROLAC TROMETHAMINE 30 MG/ML
INJECTION, SOLUTION INTRAMUSCULAR; INTRAVENOUS
Status: DISPENSED
Start: 2021-03-19

## (undated) RX ORDER — ACETAMINOPHEN 325 MG/1
TABLET ORAL
Status: DISPENSED
Start: 2021-03-19

## (undated) RX ORDER — LIDOCAINE HYDROCHLORIDE 20 MG/ML
INJECTION, SOLUTION EPIDURAL; INFILTRATION; INTRACAUDAL; PERINEURAL
Status: DISPENSED
Start: 2021-03-19